# Patient Record
Sex: FEMALE | Race: BLACK OR AFRICAN AMERICAN | NOT HISPANIC OR LATINO
[De-identification: names, ages, dates, MRNs, and addresses within clinical notes are randomized per-mention and may not be internally consistent; named-entity substitution may affect disease eponyms.]

---

## 2017-05-01 ENCOUNTER — TRANSCRIPTION ENCOUNTER (OUTPATIENT)
Age: 57
End: 2017-05-01

## 2017-12-26 ENCOUNTER — TRANSCRIPTION ENCOUNTER (OUTPATIENT)
Age: 57
End: 2017-12-26

## 2018-07-27 ENCOUNTER — TRANSCRIPTION ENCOUNTER (OUTPATIENT)
Age: 58
End: 2018-07-27

## 2018-09-25 ENCOUNTER — RESULT REVIEW (OUTPATIENT)
Age: 58
End: 2018-09-25

## 2018-10-30 ENCOUNTER — RESULT REVIEW (OUTPATIENT)
Age: 58
End: 2018-10-30

## 2019-03-21 ENCOUNTER — APPOINTMENT (OUTPATIENT)
Dept: PODIATRY | Facility: CLINIC | Age: 59
End: 2019-03-21
Payer: COMMERCIAL

## 2019-03-21 VITALS
SYSTOLIC BLOOD PRESSURE: 122 MMHG | DIASTOLIC BLOOD PRESSURE: 70 MMHG | WEIGHT: 130 LBS | HEIGHT: 64 IN | BODY MASS INDEX: 22.2 KG/M2

## 2019-03-21 DIAGNOSIS — L60.9 NAIL DISORDER, UNSPECIFIED: ICD-10-CM

## 2019-03-21 DIAGNOSIS — Z78.9 OTHER SPECIFIED HEALTH STATUS: ICD-10-CM

## 2019-03-21 PROBLEM — Z00.00 ENCOUNTER FOR PREVENTIVE HEALTH EXAMINATION: Status: ACTIVE | Noted: 2019-03-21

## 2019-03-21 PROCEDURE — 11755 BIOPSY NAIL UNIT: CPT

## 2019-03-21 PROCEDURE — 99203 OFFICE O/P NEW LOW 30 MIN: CPT | Mod: 25

## 2019-03-21 NOTE — PROCEDURE
[FreeTextEntry1] : A lengthy and inform a discussion with the patient regarding different types of treatments for the mycotic nail disease. I stressed clinical versus mycologic cure rates and anticipated success rates regarding topical medications oral medications as well as laser therapy. I discussed in great length with the patient the success rates and success rates anticipated. There were no guarantees given regarding any of the treatment reviewed. I did explain to the patient that there are risks to oral antifungal therapy however those risks can be greatly decreased with obtaining blood tests prior and possibly during the treatment if indicated. The patient will weigh their options and contact the office\par After a lengthy discussion with the patient regarding all treatment options for nail disease and the need to have a definitive diagnosis regarding knowing the etiology of the nail disease I have received verbal consent in front of my  for nail unit biopsy. Next both nails were soaked with copious amounts of viscous lidocaine as well as soaked with copious amounts of topical lidocaine. There was a significant amount of lysis from each nail off the nailbed. There was no need for injectable anesthetic at this point. Next a good portion of the RIGHT as well as the LEFT hallux nails were removed via sharp dissection as well as a good portion of the nailbed and nail tissue below the nail without significant damage/harm to the nail bed itself. Wound care was discussed at length with the patient and she would be an area it get red and painful and began to drain they should contact the office at once otherwise we will notify the patient when the results of the biopsy have returned which should be approximately 5-7 days. Part of these nails were sent for a KOH prep and part for surgical pathology. Dry sterile bandages were applied discharge instructions reviewed and the patient was advised we'll contact them when the laboratory data returns and we could have a definitive discussion regarding treatment options.\par I had a lengthy discussion with the patient regarding the way they should be cutting a nail in an effort to help prevent recurrence of this problem. I also revised self treatment should not be attempted and should there be any redness or pain on the side of the nail rather than treating it themselves they should call the office to make a follow up.\par

## 2019-03-21 NOTE — PHYSICAL EXAM
[General Appearance - Alert] : alert [General Appearance - In No Acute Distress] : in no acute distress [Full Pulse] : the pedal pulses are present [Edema] : there was no peripheral edema [Abnormal Walk] : normal gait [Nail Clubbing] : no clubbing  or cyanosis of the fingernails [Musculoskeletal - Swelling] : no joint swelling seen [Motor Tone] : muscle strength and tone were normal [Skin Color & Pigmentation] : normal skin color and pigmentation [Skin Turgor] : normal skin turgor [] : no rash [FreeTextEntry1] : The only exception is clinically appearing superficial white onychomycosis subungual debris and elevation of the nail bilateral [Deep Tendon Reflexes (DTR)] : deep tendon reflexes were 2+ and symmetric [Sensation] : the sensory exam was normal to light touch and pinprick [No Focal Deficits] : no focal deficits

## 2019-03-21 NOTE — REVIEW OF SYSTEMS
[As Noted in HPI] : as noted in HPI [Skin Lesions] : no skin lesions [Skin Wound] : no skin wound [Itching] : no itching [Negative] : Neurological [de-identified] : white flakey nails

## 2019-03-21 NOTE — HISTORY OF PRESENT ILLNESS
[FreeTextEntry1] : patient c/o Chronic thickening and widening of the top both great toenails the history of present illness is approximately 6 months treatment has been the only treatment and it is exacerbated by over-the-counter products seem to make it worse

## 2019-03-28 LAB — CORE LAB BIOPSY: NORMAL

## 2019-10-03 ENCOUNTER — RECORD ABSTRACTING (OUTPATIENT)
Age: 59
End: 2019-10-03

## 2019-10-03 DIAGNOSIS — M19.041 PRIMARY OSTEOARTHRITIS, RIGHT HAND: ICD-10-CM

## 2019-10-03 DIAGNOSIS — Z84.2 FAMILY HISTORY OF OTHER DISEASES OF THE GENITOURINARY SYSTEM: ICD-10-CM

## 2019-10-03 DIAGNOSIS — K56.609 UNSPECIFIED INTESTINAL OBSTRUCTION, UNSPECIFIED AS TO PARTIAL VERSUS COMPLETE OBSTRUCTION: ICD-10-CM

## 2019-10-03 DIAGNOSIS — K59.1 FUNCTIONAL DIARRHEA: ICD-10-CM

## 2019-10-03 DIAGNOSIS — Z82.49 FAMILY HISTORY OF ISCHEMIC HEART DISEASE AND OTHER DISEASES OF THE CIRCULATORY SYSTEM: ICD-10-CM

## 2019-10-03 DIAGNOSIS — R10.9 UNSPECIFIED ABDOMINAL PAIN: ICD-10-CM

## 2019-10-03 DIAGNOSIS — Z83.3 FAMILY HISTORY OF DIABETES MELLITUS: ICD-10-CM

## 2019-10-03 DIAGNOSIS — R00.2 PALPITATIONS: ICD-10-CM

## 2019-10-03 DIAGNOSIS — R76.8 OTHER SPECIFIED ABNORMAL IMMUNOLOGICAL FINDINGS IN SERUM: ICD-10-CM

## 2019-10-03 DIAGNOSIS — Z83.49 FAMILY HISTORY OF OTHER ENDOCRINE, NUTRITIONAL AND METABOLIC DISEASES: ICD-10-CM

## 2019-10-03 DIAGNOSIS — R00.0 TACHYCARDIA, UNSPECIFIED: ICD-10-CM

## 2019-10-03 RX ORDER — ASCORBIC ACID 250 MG
250 TABLET,CHEWABLE ORAL
Refills: 0 | Status: ACTIVE | COMMUNITY

## 2019-10-22 ENCOUNTER — APPOINTMENT (OUTPATIENT)
Dept: GASTROENTEROLOGY | Facility: HOSPITAL | Age: 59
End: 2019-10-22

## 2020-04-26 ENCOUNTER — MESSAGE (OUTPATIENT)
Age: 60
End: 2020-04-26

## 2020-05-04 ENCOUNTER — APPOINTMENT (OUTPATIENT)
Age: 60
End: 2020-05-04

## 2020-05-05 LAB
SARS-COV-2 IGG SERPL IA-ACNC: 7.4 AU/ML
SARS-COV-2 IGG SERPL QL IA: NEGATIVE

## 2020-06-02 ENCOUNTER — APPOINTMENT (OUTPATIENT)
Dept: PODIATRY | Facility: CLINIC | Age: 60
End: 2020-06-02
Payer: COMMERCIAL

## 2020-06-02 VITALS
WEIGHT: 130 LBS | HEIGHT: 64 IN | DIASTOLIC BLOOD PRESSURE: 80 MMHG | SYSTOLIC BLOOD PRESSURE: 140 MMHG | BODY MASS INDEX: 22.2 KG/M2

## 2020-06-02 PROCEDURE — 73630 X-RAY EXAM OF FOOT: CPT | Mod: LT

## 2020-06-02 PROCEDURE — L3000: CPT | Mod: RT

## 2020-06-02 PROCEDURE — 99213 OFFICE O/P EST LOW 20 MIN: CPT | Mod: 25

## 2020-06-02 NOTE — PROCEDURE
[FreeTextEntry1] : X-rays were taken in the office multiple views right foot\par X-rays were taken in the office multiple views of the left foot\par I have reviewed the x-rays taken in the office with the patient, I have discussed my findings my recommendations etiology and treatment options based on x-ray evaluation and interpretation and patient understands, There is no evidence of fracture dislocation\par I had a lengthy and informative discussion with the patient today regarding plantar fasciitis. I explained to the patient that there are several etiologies as well contributing factors to this problem as well as what can aggravate it. It could include the presence or lack of of a heel spur, the type of foot type they have, the way they walk, it also could be related to the type of shoes they wear. I also made them understand that it could be a combination of all these problems together. I explained etiologies treatment options both conservative and surgical. I gave educational literature regarding this problem. Some of the treatment options as they range from conservative to aggressive include over-the-counter anti-inflammatories, prescription anti-inflammatories, custom shoes, custom orthotics, steroid injection, physical therapy, night splints, orthotripsy, and NSAIDS. There is also possible surgical intervention if all conservative measures failed to alleviate the problem. I did explain to the patient the type of shoe gear this should be wearing and gave him a copy of my plantar fascia stretching exercises with instructions to ice afterwards.\par Refuses nsaids\par The patient was advised formal physical therapy is critical at this point and that I recommend it in order to try and achieve best possible outcome to their problem. Rx has been supplied.\par A complete and thorough evaluation of the type of shoes they should be wearing and type of shoes for this time of year was discussed with patient.\par \par During the evaluation and management I had a lengthy discussion with the patient regarding benefits of functional foot orthoses. I explained to the patient the etiology and treatment options and one of them included the offloading and balancing of the painful portion of the foot. I explained the importance of balancing in offloading the painful area as part of the overall treatment process to advance healing. I have asked the patient to consider this as part of the treatment\par After a lengthy discussion with the patient regarding the possible benefits of orthotics and what we hope to achieve with them as it relates to their diagnosis the patient has agreed to be casted for the devices, The patient was then casted for a pair of custom functional foot orthoses with the subtalar joint in neutral, the forefoot locked, The patient was advised that they will be notified when the orthotics are returned from the laboratory, Should there be any questions or concerns they were advised to contact the office immediately, Educational literature regarding orthotics were dispensed, Included in the casting for orthotics was an overall gait exam and biomechanical evaluation\par

## 2020-06-02 NOTE — PHYSICAL EXAM
[Skin Color & Pigmentation] : normal skin color and pigmentation [Skin Turgor] : normal skin turgor [] : no rash [Sensation] : the sensory exam was normal to light touch and pinprick [Deep Tendon Reflexes (DTR)] : deep tendon reflexes were 2+ and symmetric [No Focal Deficits] : no focal deficits [Impaired Insight] : insight and judgment were intact [Affect] : the affect was normal [Oriented To Time, Place, And Person] : oriented to person, place, and time [Full Pulse] : the pedal pulses are present [Edema] : there was no peripheral edema [FreeTextEntry1] : no bruising, no ecchymosis, no breaks in the skin, no evidence of plantar fibromas noted., no history of injury or trauma, reproducible pain upon palpation of the plantar aspect of the calcaneus without medial lateral squeeze pain, pain along the medial band of the plantar fascia and insertion of the plantar fascia to calcaneus, patient able to walk on the heels but does admit to pain, admits to post-static dyskinesia., admits to pain at the end of the day., no pain to the posterior aspect of the calcaneus, no evidence of Aicha's deformity, no void felt in the Achilles tendon, patient able to walk on there toes without pain, denies numbness tingling and sharp shooting pains, no evidence of a Tinel's sign upon percussion of the posterior tibial nerve\par \par \par

## 2020-06-02 NOTE — REVIEW OF SYSTEMS
[As Noted in HPI] : as noted in HPI [Negative] : Integumentary [Leg Claudication] : no intermittent leg claudication [Lower Ext Edema] : no lower extremity edema

## 2020-06-23 ENCOUNTER — APPOINTMENT (OUTPATIENT)
Dept: PODIATRY | Facility: CLINIC | Age: 60
End: 2020-06-23
Payer: COMMERCIAL

## 2020-06-23 VITALS — BODY MASS INDEX: 22.2 KG/M2 | WEIGHT: 130 LBS | HEIGHT: 64 IN

## 2020-06-23 PROCEDURE — 99213 OFFICE O/P EST LOW 20 MIN: CPT

## 2020-06-23 NOTE — HISTORY OF PRESENT ILLNESS
[FreeTextEntry1] : Location-both feet\par Duration-4 months\par Past tx- self PT, formal PT\par presents to  orthotics\par

## 2020-06-23 NOTE — PHYSICAL EXAM
[General Appearance - In No Acute Distress] : in no acute distress [General Appearance - Alert] : alert [Full Pulse] : the pedal pulses are present [Edema] : there was no peripheral edema [Skin Turgor] : normal skin turgor [Skin Color & Pigmentation] : normal skin color and pigmentation [] : no rash [Sensation] : the sensory exam was normal to light touch and pinprick [No Focal Deficits] : no focal deficits [Deep Tendon Reflexes (DTR)] : deep tendon reflexes were 2+ and symmetric [FreeTextEntry1] : Examination shows less pain upon palpation of the plantar aspect of the heel, little pain on the medial band of the plantar fascia, the patient denies as much pain at the end of the day, the pain they currently experience is more in the form of post static dyskinesia. The patient states prior treatments have improved the condition.

## 2020-06-23 NOTE — PROCEDURE
[FreeTextEntry1] : Orthotic Evaluation the orthotics have been evaluated and I do feel that there is a good fit to the patient's foot and they have been made to my specifications. \par          Clinical Notes: The patient presents for dispensing of custom molded foot orthotics. I have removed the orthotics from the packaging and I have examined him. They do appear to be made as per my instructions regarding materials additions corrections. Upon placing him to the patient's foot they do appear to fit nicely. There is no material failure nor gapping. They were then trimmed to fit and placed inside the patient's shoe gear. There appears to be a good fit. Upon initial ambulation the patient has no initial complaints regarding pain off edges were tight fit. The patient did ambulate around the office for several minutes and had a favorable result. I then explained to the patient the normal break-in period. The paperwork supplied by the laboratory was reviewed with the patient. They understand a normal break-in period is to be gradual over several weeks. I've advised the patient that different, weird, and uncomfortable are certainly acceptable words in the beginning however pain, blister and callus are things that should not occur. Once the proper break-in was explained to the patient they were given an appointment to follow up.\par \par Since the patient is currently taking non-steroidal anti-inflammatory medication I had a complete and thorough discussion with the patient regarding risks and benefits of these types of medications. The most common side effects include but are not limited to gastrointestinal upset, blood in the stool, nausea, diarrhea, as well as tinnitus. I have advised the patient that if they should experience any of the side effects that she discontinue them at once and contacted primary care physician for immediate followup. I did advise the patient that these types of medications can be taken on an as needed basis and if they are not having pain he should not take them. I also advised that the patient should follow the explicit instructions on the labile and not to exceed the recommended dosage.\par

## 2020-10-02 ENCOUNTER — TRANSCRIPTION ENCOUNTER (OUTPATIENT)
Age: 60
End: 2020-10-02

## 2021-04-07 ENCOUNTER — APPOINTMENT (OUTPATIENT)
Dept: PODIATRY | Facility: CLINIC | Age: 61
End: 2021-04-07
Payer: COMMERCIAL

## 2021-04-07 VITALS — WEIGHT: 130 LBS | HEIGHT: 64 IN | BODY MASS INDEX: 22.2 KG/M2

## 2021-04-07 PROCEDURE — 99214 OFFICE O/P EST MOD 30 MIN: CPT | Mod: 25

## 2021-04-07 PROCEDURE — 99072 ADDL SUPL MATRL&STAF TM PHE: CPT

## 2021-04-07 PROCEDURE — L3000: CPT | Mod: RT

## 2021-04-07 NOTE — PHYSICAL EXAM
[Full Pulse] : the pedal pulses are present [Edema] : there was no peripheral edema [Skin Color & Pigmentation] : normal skin color and pigmentation [Skin Turgor] : normal skin turgor [] : no rash [Deep Tendon Reflexes (DTR)] : deep tendon reflexes were 2+ and symmetric [Sensation] : the sensory exam was normal to light touch and pinprick [No Focal Deficits] : no focal deficits [Oriented To Time, Place, And Person] : oriented to person, place, and time [Impaired Insight] : insight and judgment were intact [Affect] : the affect was normal [FreeTextEntry1] : no bruising, no ecchymosis, no breaks in the skin, no evidence of plantar fibromas noted., no history of injury or trauma, reproducible pain upon palpation of the plantar aspect of the calcaneus without medial lateral squeeze pain, pain along the medial band of the plantar fascia and insertion of the plantar fascia to calcaneus, patient able to walk on the heels but does admit to pain, admits to post-static dyskinesia., admits to pain at the end of the day., no pain to the posterior aspect of the calcaneus, no evidence of Aicha's deformity, no void felt in the Achilles tendon, patient able to walk on there toes without pain, denies numbness tingling and sharp shooting pains, no evidence of a Tinel's sign upon percussion of the posterior tibial nerve\par \par \par

## 2021-04-07 NOTE — REVIEW OF SYSTEMS
[As Noted in HPI] : as noted in HPI [Negative] : Constitutional [Leg Claudication] : no intermittent leg claudication [Lower Ext Edema] : no lower extremity edema

## 2021-04-07 NOTE — PROCEDURE
[FreeTextEntry1] : All diagnostic test, labs, imaging, prior notes and reports (both new and recent) reviewed prior to seeing the patient and discussed at length face to face with the patient. How these results pertain to the patient, their diagnosis and treatments also reviewed. All questions asked and answered appropriately. The risks and complications of not following my recommendations d/w the patient.\par had a lengthy discussion with the patient regarding the diagnosis etiology and differential diagnosis as well as treatment options for the presenting problem. Risks alternatives and benefits of treatment ranging from conservative to surgical explained in great detail. I also explained the progression of treatment from conservative to possible surgical treatment options as well as the benefits of each. I do stress conservative treatment if in fact conservative treatment is an option until it no longer provides relief. Over-the-counter products medications padding, and splinting were reviewed as well. All questions asked and answered appropriately to the patient's satisfaction\par An overall gait examination was performed where the rearfoot and the midfoot and forefoot was evaluated both with the patient walking away and then towards me. then again repeated on their toes and their heels. I then explained my findings to the patient and then may benefit from a pair of orthotics and how the orthotics would control their symptoms\par a complete and comprehensive lower extremity biomechanical exam was performed., I evaluated the rear foot the subtalar joint the midfoot and forefoot during the comprehensive gait evaluation, muscle strength as well as muscle strength testing was incorporated into my findings when evaluating the lower extremity biomechanics., my findings were discussed reviewed and explained to the patient, I do think the patient would benefit from a pair of custom molded foot orthoses I have reviewed the benefits of the orthotics and advised they would benefit and have recommended they proceed with fabrication\par The patient was advised formal physical therapy is critical at this point and that I recommend it in order to try and achieve best possible outcome to their problem. Rx has been supplied.\par After a lengthy discussion with the patient regarding the possible benefits of orthotics and what we hope to achieve with them as it relates to their diagnosis the patient has agreed to be casted for the devices, The patient was then casted for a pair of custom functional foot orthoses with the subtalar joint in neutral, the forefoot locked, The patient was advised that they will be notified when the orthotics are returned from the laboratory, Should there be any questions or concerns they were advised to contact the office immediately, Educational literature regarding orthotics were dispensed, Included in the casting for orthotics was an overall gait exam and biomechanical evaluation\par greater than 30 minutes spent with patient\par \par I had a lengthy and informative discussion with the patient today regarding plantar fasciitis. I explained to the patient that there are several etiologies as well contributing factors to this problem as well as what can aggravate it. It could include the presence or lack of of a heel spur, the type of foot type they have, the way they walk, it also could be related to the type of shoes they wear. I also made them understand that it could be a combination of all these problems together. I explained etiologies treatment options both conservative and surgical. I gave educational literature regarding this problem. Some of the treatment options as they range from conservative to aggressive include over-the-counter anti-inflammatories, prescription anti-inflammatories, custom shoes, custom orthotics, steroid injection, physical therapy, night splints, orthotripsy, and NSAIDS. There is also possible surgical intervention if all conservative measures failed to alleviate the problem. I did explain to the patient the type of shoe gear this should be wearing and gave him a copy of my plantar fascia stretching exercises with instructions to ice afterwards.\par Refuses nsaids\par The patient was advised formal physical therapy is critical at this point and that I recommend it in order to try and achieve best possible outcome to their problem. Rx has been supplied.\par A complete and thorough evaluation of the type of shoes they should be wearing and type of shoes for this time of year was discussed with patient.\par \par During the evaluation and management I had a lengthy discussion with the patient regarding benefits of functional foot orthoses. I explained to the patient the etiology and treatment options and one of them included the offloading and balancing of the painful portion of the foot. I explained the importance of balancing in offloading the painful area as part of the overall treatment process to advance healing. I have asked the patient to consider this as part of the treatment\par After a lengthy discussion with the patient regarding the possible benefits of orthotics and what we hope to achieve with them as it relates to their diagnosis the patient has agreed to be casted for the devices, The patient was then casted for a pair of custom functional foot orthoses with the subtalar joint in neutral, the forefoot locked, The patient was advised that they will be notified when the orthotics are returned from the laboratory, Should there be any questions or concerns they were advised to contact the office immediately, Educational literature regarding orthotics were dispensed, Included in the casting for orthotics was an overall gait exam and biomechanical evaluation\par

## 2021-05-05 ENCOUNTER — APPOINTMENT (OUTPATIENT)
Dept: PODIATRY | Facility: CLINIC | Age: 61
End: 2021-05-05
Payer: COMMERCIAL

## 2021-05-05 VITALS — BODY MASS INDEX: 22.2 KG/M2 | HEIGHT: 64 IN | WEIGHT: 130 LBS

## 2021-05-05 PROCEDURE — 99213 OFFICE O/P EST LOW 20 MIN: CPT

## 2021-05-05 PROCEDURE — 99072 ADDL SUPL MATRL&STAF TM PHE: CPT

## 2021-05-05 NOTE — PHYSICAL EXAM
[Full Pulse] : the pedal pulses are present [Edema] : there was no peripheral edema [Skin Color & Pigmentation] : normal skin color and pigmentation [Skin Turgor] : normal skin turgor [] : no rash [Deep Tendon Reflexes (DTR)] : deep tendon reflexes were 2+ and symmetric [Sensation] : the sensory exam was normal to light touch and pinprick [No Focal Deficits] : no focal deficits [Oriented To Time, Place, And Person] : oriented to person, place, and time [Impaired Insight] : insight and judgment were intact [Affect] : the affect was normal [FreeTextEntry1] : Examination shows less pain upon palpation of the plantar aspect of the heel, little pain on the medial band of the plantar fascia, the patient denies as much pain at the end of the day, the pain they currently experience is more in the form of post static dyskinesia. The patient states prior treatments have significantly improved the condition. No additional signs or symptoms regarding heel pain noted.

## 2021-05-05 NOTE — PROCEDURE
[FreeTextEntry1] : Orthotic Evaluation the orthotics have been evaluated and I do feel that there is a good fit to the patient's foot and they have been made to my specifications. \par          Clinical Notes: The patient presents for dispensing of custom molded foot orthotics. I have removed the orthotics from the packaging and I have examined him. They do appear to be made as per my instructions regarding materials additions corrections. Upon placing him to the patient's foot they do appear to fit nicely. There is no material failure nor gapping. They were then trimmed to fit and placed inside the patient's shoe gear. There appears to be a good fit. Upon initial ambulation the patient has no initial complaints regarding pain off edges were tight fit. The patient did ambulate around the office for several minutes and had a favorable result. I then explained to the patient the normal break-in period. The paperwork supplied by the laboratory was reviewed with the patient. They understand a normal break-in period is to be gradual over several weeks. I've advised the patient that different, weird, and uncomfortable are certainly acceptable words in the beginning however pain, blister and callus are things that should not occur. Once the proper break-in was explained to the patient they were given an appointment to follow up.\par \par Patient was advised to continue formal PT because he is feeling good relief from it.\par \par follow up appt 3 weeks\par

## 2021-05-05 NOTE — HISTORY OF PRESENT ILLNESS
[FreeTextEntry1] : Location-both HEELS, RIGHT >LEFT\par Duration-> 1 YEAR months\par Past tx- self PT, formal PT, RICE, soaks, \par requests a firmer pair does not think these support her enough\par \par In PT and when she goes she sees improvement\par here to p/u new orthotics

## 2021-05-11 ENCOUNTER — TRANSCRIPTION ENCOUNTER (OUTPATIENT)
Age: 61
End: 2021-05-11

## 2021-07-06 ENCOUNTER — RESULT REVIEW (OUTPATIENT)
Age: 61
End: 2021-07-06

## 2021-07-06 ENCOUNTER — APPOINTMENT (OUTPATIENT)
Dept: PODIATRY | Facility: CLINIC | Age: 61
End: 2021-07-06
Payer: COMMERCIAL

## 2021-07-06 ENCOUNTER — NON-APPOINTMENT (OUTPATIENT)
Age: 61
End: 2021-07-06

## 2021-07-06 VITALS — HEIGHT: 64 IN | BODY MASS INDEX: 22.2 KG/M2 | WEIGHT: 130 LBS

## 2021-07-06 PROCEDURE — 20550 NJX 1 TENDON SHEATH/LIGAMENT: CPT | Mod: RT

## 2021-07-06 PROCEDURE — 99072 ADDL SUPL MATRL&STAF TM PHE: CPT

## 2021-07-06 RX ORDER — METHYLPRED ACET/NACL,ISO-OS/PF 40 MG/ML
40 VIAL (ML) INJECTION
Qty: 1 | Refills: 0 | Status: COMPLETED | OUTPATIENT
Start: 2021-07-06

## 2021-07-06 RX ADMIN — METHYLPREDNISOLONE ACETATE MG/ML: 40 INJECTION, SUSPENSION INTRA-ARTICULAR; INTRALESIONAL; INTRAMUSCULAR; SOFT TISSUE at 00:00

## 2021-07-06 NOTE — PHYSICAL EXAM
Communication History     User: Yasmine Eckert Date/time: 3/28/2018  2:35 PM    Comment: Per Jaron at [facility], shower bench delivered on 03/21/18. CC notified.    Context:  Outcome:     Phone number:  Phone Type:     Comm. type: Email Call type: Incoming    Contact: [facility] Medical Relation to patient: Donya Eckert  CMS Supervisor  [facility]  [phone]     [Full Pulse] : the pedal pulses are present [Edema] : there was no peripheral edema [Skin Turgor] : normal skin turgor [Skin Color & Pigmentation] : normal skin color and pigmentation [] : no rash [Deep Tendon Reflexes (DTR)] : deep tendon reflexes were 2+ and symmetric [No Focal Deficits] : no focal deficits [Sensation] : the sensory exam was normal to light touch and pinprick [Oriented To Time, Place, And Person] : oriented to person, place, and time [Impaired Insight] : insight and judgment were intact [Affect] : the affect was normal [FreeTextEntry1] : 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

## 2021-07-06 NOTE — PROCEDURE
[FreeTextEntry1] : Patient was advised to continue formal PT because he is feeling good relief from it.\par A lengthy discussion with the patient regarding risks and benefits of steroid injection. I explained to the patient that in the natural progression of orthopedic type problems oral anti-inflammatories and injectable anti-inflammatory medication or an integral part of the treatment process. I did explain to the patient some of the risks associated with steroid injection included but were not limited to infection at the puncture site, discoloration of skin, the condition being no better, and most importantly the risk of soft tissue injury. I did explain to the patient, in certain rare occasions, there is soft tissue damage including but not limited to atrophy of the soft tissue, necrosis of the tissue, rupture of the ligament, and or possibly rupture of the tendon.\par After weighing the risks alternatives and benefits to the injection the patient agreed to proceed. All questions asked and answered appropriately.\par \par After obtaining verbal consent and a lengthy discussion regarding risks alternatives and benefits the patient had approximately 2 cc of 0.5% Marcaine plain mixed with 2 cc of 1% lidocaine plain 1 cc of Depo-Medrol 40 mg per cc. The medial aspect of the affected heel was cleansed with alcohol painted with Betadine and using ethyl chloride and approach and a local infiltrated in and about the plantar fascia to calcaneal insertion area. It was a local infiltrated block the patient tolerated well left the office with vital signs stable vascular status intact.\par

## 2021-07-06 NOTE — HISTORY OF PRESENT ILLNESS
[FreeTextEntry1] : Location-right heel, left is ok\par Duration-> 1 YEAR months\par Past tx- self PT, formal PT, RICE, chilo, \par \par \par

## 2021-08-03 ENCOUNTER — APPOINTMENT (OUTPATIENT)
Dept: PODIATRY | Facility: CLINIC | Age: 61
End: 2021-08-03

## 2021-08-13 ENCOUNTER — APPOINTMENT (OUTPATIENT)
Dept: PODIATRY | Facility: CLINIC | Age: 61
End: 2021-08-13
Payer: COMMERCIAL

## 2021-08-13 VITALS — HEIGHT: 64 IN | WEIGHT: 130 LBS | BODY MASS INDEX: 22.2 KG/M2

## 2021-08-13 DIAGNOSIS — M77.51 OTHER ENTHESOPATHY OF RT FOOT AND ANKLE: ICD-10-CM

## 2021-08-13 DIAGNOSIS — M77.52 OTHER ENTHESOPATHY OF LT FOOT AND ANKLE: ICD-10-CM

## 2021-08-13 PROCEDURE — 99213 OFFICE O/P EST LOW 20 MIN: CPT

## 2021-08-13 NOTE — PROCEDURE
[FreeTextEntry1] : Copy of my range of motion and strengthening exercises were distributed to the patient. I also explained that the patient must do these daily as well as ice afterwards. I also advised the type of shoe gear that would both support as well as conversely aggravate this condition. that since they are seen good results from physical therapy that they\par I have advised the patient that they may return to normal activity level but to limit it to tolerance. I advised them at this time that they do not need any assistive device special shoe bandaging or bracing. I also advised that should they be doing her normal daily function and they should experience some pain that upon finishing that they should return to anti-inflammatory medication over-the-counter if possible, ice and elevation. If this continues more than 24-48 hours he should contact the office immediately for followup appointment\par A complete and thorough evaluation of the type of shoes they should be wearing and type of shoes for this time of year was discussed with patient.\par NSAIDS prn\par follow up prn\par

## 2021-08-13 NOTE — HISTORY OF PRESENT ILLNESS
[FreeTextEntry1] : Location-right heel, left is ok\par Duration-> 1 YEAR months\par Past tx- self PT, formal PT, RICE, soaks, , steroid\par \par states 90% improved\par \par \par

## 2021-09-02 ENCOUNTER — NON-APPOINTMENT (OUTPATIENT)
Age: 61
End: 2021-09-02

## 2021-10-11 ENCOUNTER — APPOINTMENT (OUTPATIENT)
Dept: PODIATRY | Facility: CLINIC | Age: 61
End: 2021-10-11
Payer: COMMERCIAL

## 2021-10-11 VITALS — WEIGHT: 130 LBS | HEIGHT: 64 IN | BODY MASS INDEX: 22.2 KG/M2

## 2021-10-11 DIAGNOSIS — M20.12 HALLUX VALGUS (ACQUIRED), LEFT FOOT: ICD-10-CM

## 2021-10-11 PROCEDURE — 99213 OFFICE O/P EST LOW 20 MIN: CPT

## 2021-10-11 PROCEDURE — 73630 X-RAY EXAM OF FOOT: CPT | Mod: LT

## 2021-10-11 RX ORDER — DICLOFENAC SODIUM 50 MG/1
50 TABLET, DELAYED RELEASE ORAL
Qty: 28 | Refills: 1 | Status: DISCONTINUED | COMMUNITY
Start: 2021-07-06 | End: 2021-10-11

## 2021-10-11 NOTE — PROCEDURE
[FreeTextEntry1] : X-rays were taken in the office multiple views of the left foot\par HAV noted, xray negative for heel spur\par \par had a lengthy discussion with the patient regarding the diagnosis etiology and differential diagnosis as well as treatment options for the presenting problem. Risks alternatives and benefits of treatment ranging from conservative to surgical explained in great detail. I also explained the progression of treatment from conservative to possible surgical treatment options as well as the benefits of each. I do stress conservative treatment if in fact conservative treatment is an option until it no longer provides relief. Over-the-counter products medications padding, and splinting were reviewed as well. All questions asked and answered appropriately to the patient's satisfaction\par \par Since the patient is currently taking non-steroidal anti-inflammatory medication I had a complete and thorough discussion with the patient regarding risks and benefits of these types of medications. The most common side effects include but are not limited to gastrointestinal upset, blood in the stool, nausea, diarrhea, as well as tinnitus. I have advised the patient that if they should experience any of the side effects that she discontinue them at once and contacted primary care physician for immediate followup. I did advise the patient that these types of medications can be taken on an as needed basis and if they are not having pain he should not take them. I also advised that the patient should follow the explicit instructions on the labile and not to exceed the recommended dosage.\par \par The patient was advised formal physical therapy is critical at this point and that I recommend it in order to try and achieve best possible outcome to their problem. Rx has been supplied.\par \par continue w/ orthotics\par \par The patient was advised formal physical therapy is critical at this point and that I recommend it in order to try and achieve best possible outcome to their problem. Rx has been supplied.\par \par follow up appt 4 weeks\par

## 2021-10-11 NOTE — REVIEW OF SYSTEMS
[Negative] : Integumentary [Leg Claudication] : no intermittent leg claudication [Lower Ext Edema] : no lower extremity edema [Joint Swelling] : no joint swelling [Joint Stiffness] : no joint stiffness [Limb Pain] : no limb pain [Limb Swelling] : no limb swelling [FreeTextEntry9] : left heel pain and left bunion

## 2021-10-11 NOTE — HISTORY OF PRESENT ILLNESS
[FreeTextEntry1] : Location-bottom of left heel\par Duration-about a month\par Past tx- nsaids, orthotics\par

## 2021-10-11 NOTE — REASON FOR VISIT
[Follow-Up Visit] : a follow-up visit for [Foot Deformity] : foot deformity [Foot Pain] : foot pain [FreeTextEntry2] : left foot, heel and bunion

## 2022-01-05 ENCOUNTER — APPOINTMENT (OUTPATIENT)
Dept: PODIATRY | Facility: CLINIC | Age: 62
End: 2022-01-05
Payer: COMMERCIAL

## 2022-01-05 VITALS — HEIGHT: 64 IN | WEIGHT: 130 LBS | BODY MASS INDEX: 22.2 KG/M2

## 2022-01-05 DIAGNOSIS — M72.2 PLANTAR FASCIAL FIBROMATOSIS: ICD-10-CM

## 2022-01-05 PROCEDURE — 20550 NJX 1 TENDON SHEATH/LIGAMENT: CPT

## 2022-01-05 PROCEDURE — 99213 OFFICE O/P EST LOW 20 MIN: CPT | Mod: 25

## 2022-01-05 RX ORDER — DEXAMETHASONE SODIUM PHOSPHATE 4 MG/ML
4 INJECTION, SOLUTION INTRAMUSCULAR; INTRAVENOUS
Qty: 0 | Refills: 0 | Status: COMPLETED | OUTPATIENT
Start: 2022-01-05

## 2022-01-05 RX ORDER — DICLOFENAC SODIUM 50 MG/1
50 TABLET, DELAYED RELEASE ORAL
Qty: 60 | Refills: 1 | Status: DISCONTINUED | COMMUNITY
Start: 2021-10-11 | End: 2022-01-05

## 2022-01-05 RX ORDER — METHYLPRED ACET/NACL,ISO-OS/PF 40 MG/ML
40 VIAL (ML) INJECTION
Qty: 1 | Refills: 0 | Status: COMPLETED | OUTPATIENT
Start: 2022-01-05

## 2022-01-05 RX ADMIN — METHYLPREDNISOLONE ACETATE MG/ML: 40 INJECTION, SUSPENSION INTRA-ARTICULAR; INTRALESIONAL; INTRAMUSCULAR; SOFT TISSUE at 00:00

## 2022-01-05 RX ADMIN — DEXAMETHASONE SODIUM PHOSPHATE MG/ML: 4 INJECTION, SOLUTION INTRA-ARTICULAR; INTRALESIONAL; INTRAMUSCULAR; INTRAVENOUS; SOFT TISSUE at 00:00

## 2022-01-05 NOTE — PROCEDURE
[FreeTextEntry1] : The patient was advised formal physical therapy is critical at this point and that I recommend it in order to try and achieve best possible outcome to their problem. Rx has been supplied.\par After evaluating the patient and examining the area in question I feel at this time a magnetic resonance imaging is indicated right side to rule out stress fx or PF tear. Will also rx US to measure thickness of PF. this is  a medical necessity and this note will serve as a letter medical necessity. Given the severity of the injury and the mechanism of injury I am concerned about osseous as well as significant soft tissue pathology, injury tear and possible rupture. I feel that an magnetic resonance imaging is the most appropriate diagnostic tests at this time and the patient should be granted authorization for an magnetic resonance imaging\par I had a lengthy discussion with the patient today regarding proceeding with a mildly invasive procedure called Tenex procedure. Tenex is a minimally invasive procedure specially designed for those who are suffering from pain associated with tendon damage, such as pain in the rotator cuff, tennis or golfer's elbow, runner's or jumper's knee, Achilles tendonitis, or plantar fasciitis. It  locates & removes scar tissue that causes pain as a result of overuse or active life.Once the source of your tendon pain is identified, your doctor typically numbs the area with a local anesthetic, allowing you to stay awake the entire time. Many people say after the numbing process which feels like a bee sting they felt only a slight pressure during the procedure (if they felt anything at all). The doctor then uses gentle ultrasonic energy designed to safely breakdown and remove the damaged tissue. The ultrasonic energy is applied with the TX MicroTip, which requires only a micro incision to reach the damaged tissue. Because the incision is so small (similar to that of a cortisone shot) and the ultrasonic energy precisely treats only the damaged tendon tissue, the surrounding healthy tissue is left unharmed.\par left foot:\par A lengthy discussion with the patient regarding risks and benefits of steroid injection. I explained to the patient that in the natural progression of orthopedic type problems oral anti-inflammatories and injectable anti-inflammatory medication or an integral part of the treatment process. I did explain to the patient some of the risks associated with steroid injection included but were not limited to infection at the puncture site, discoloration of skin, the condition being no better, and most importantly the risk of soft tissue injury. I did explain to the patient, in certain rare occasions, there is soft tissue damage including but not limited to atrophy of the soft tissue, necrosis of the tissue, rupture of the ligament, and or possibly rupture of the tendon.\par After weighing the risks alternatives and benefits to the injection the patient agreed to proceed. All questions asked and answered appropriately.\par \par After obtaining verbal consent and a lengthy discussion regarding risks alternatives and benefits the patient had approximately 2 cc of 0.5% Marcaine plain mixed with 2 cc of 1% lidocaine plain 1 cc of Depo-Medrol 40 mg per cc and 1 cc of dexamethasone phosphate. The medial aspect of the affected heel was cleansed with alcohol painted with Betadine and using ethyl chloride and approach and a local infiltrated in and about the plantar fascia to calcaneal insertion area. It was a local infiltrated block the patient tolerated well left the office with vital signs stable vascular status intact.\par \par

## 2022-01-05 NOTE — PHYSICAL EXAM
[Full Pulse] : the pedal pulses are present [Edema] : there was no peripheral edema [Skin Color & Pigmentation] : normal skin color and pigmentation [Skin Turgor] : normal skin turgor [] : no rash [Deep Tendon Reflexes (DTR)] : deep tendon reflexes were 2+ and symmetric [Sensation] : the sensory exam was normal to light touch and pinprick [No Focal Deficits] : no focal deficits [Oriented To Time, Place, And Person] : oriented to person, place, and time [Impaired Insight] : insight and judgment were intact [Affect] : the affect was normal [FreeTextEntry1] : no bruising, no ecchymosis, no breaks in the skin, no evidence of plantar fibromas noted., no history of injury or trauma, reproducible pain upon palpation of the plantar aspect of the calcaneus without medial lateral squeeze pain, pain along the medial band of the plantar fascia and insertion of the plantar fascia to calcaneus, patient able to walk on the heels but does admit to pain, admits to post-static dyskinesia., admits to pain at the end of the day., no pain to the posterior aspect of the calcaneus, no evidence of Aicha's deformity, no void felt in the Achilles tendon, patient able to walk on there toes without pain, denies numbness tingling and sharp shooting pains, no evidence of a Tinel's sign upon percussion of the posterior tibial nerve\par \par \par  bilateral. requests bilateral steroid injection

## 2022-01-05 NOTE — REASON FOR VISIT
[Follow-Up Visit] : a follow-up visit for [Plantar Fasciitis] : plantar fasciitis [FreeTextEntry2] : BILATERAL--RIGHT FOOT SUDDEN ACUTE PAIN, LEFT CHRONIC BUT WORSENING

## 2022-01-30 ENCOUNTER — RESULT REVIEW (OUTPATIENT)
Age: 62
End: 2022-01-30

## 2022-02-01 ENCOUNTER — RESULT REVIEW (OUTPATIENT)
Age: 62
End: 2022-02-01

## 2022-02-03 ENCOUNTER — APPOINTMENT (OUTPATIENT)
Dept: PODIATRY | Facility: CLINIC | Age: 62
End: 2022-02-03
Payer: COMMERCIAL

## 2022-02-03 DIAGNOSIS — S96.911A STRAIN OF UNSPECIFIED MUSCLE AND TENDON AT ANKLE AND FOOT LEVEL, RIGHT FOOT, INITIAL ENCOUNTER: ICD-10-CM

## 2022-02-03 PROCEDURE — 99213 OFFICE O/P EST LOW 20 MIN: CPT

## 2022-02-03 NOTE — REASON FOR VISIT
[Follow-Up Visit] : a follow-up visit for [Plantar Fasciitis] : plantar fasciitis [FreeTextEntry2] : both feet

## 2022-02-03 NOTE — PHYSICAL EXAM
[General Appearance - Alert] : alert [General Appearance - In No Acute Distress] : in no acute distress [Skin Color & Pigmentation] : normal skin color and pigmentation [Skin Turgor] : normal skin turgor [Skin Lesions] : no skin lesions [Sensation] : the sensory exam was normal to light touch and pinprick [No Focal Deficits] : no focal deficits [Deep Tendon Reflexes (DTR)] : deep tendon reflexes were 2+ and symmetric [Motor Exam] : the motor exam was normal [Edema] : there was no peripheral edema [] : no rash [FreeTextEntry3] : Vascular exam reveals palpable pedal pulses, the foot is warm to touch, there was good capillary fill time, the skin is normal in appearance there is no evidence of vascular disease or compromise at this time [de-identified] : Examination shows less pain upon palpation of the plantar aspect of the heel, little pain on the medial band of the plantar fascia, the patient denies as much pain at the end of the day, the pain they currently experience is more in the form of post static dyskinesia. The patient states prior treatments have significantly improved the condition. No additional signs or symptoms regarding heel pain noted.\par However, on nsaid bid\par  [Foot Ulcer] : no foot ulcer [Skin Induration] : no skin induration

## 2022-02-03 NOTE — PROCEDURE
[FreeTextEntry1] : I have discussed the findings of the MRI and U/S pertaining to the presenting complaint. Treatment options also discussed with the patient. I also advised the patient that while an MRI is a good diagnostic tool is not a perfect picture and at times does not show all patholgy\par Seriousness of tear highl;ighted\par The patient presents with a pair of orthotics, and I have fitted them to their feet and they fit nicely with no evidence of breakdown. I advised them to continue to use them on a consistent basis\par I have advised the patient that they may return to normal activity level but to limit it to tolerance. I advised them at this time that they do not need any assistive device special shoe bandaging or bracing. I also advised that should they be doing her normal daily function and they should experience some pain that upon finishing that they should return to anti-inflammatory medication over-the-counter if possible, ice and elevation. If this continues more than 24-48 hours he should contact the office immediately for followup appointment\par A complete and thorough evaluation of the type of shoes they should be wearing and type of shoes for this time of year was discussed with patient.\par \par reduce nsaids to QD and only prn\par \par follow up prn\par \par \par  11-Feb-2020 17:18

## 2022-04-06 ENCOUNTER — RESULT REVIEW (OUTPATIENT)
Age: 62
End: 2022-04-06

## 2022-07-13 ENCOUNTER — APPOINTMENT (OUTPATIENT)
Dept: PODIATRY | Facility: CLINIC | Age: 62
End: 2022-07-13

## 2022-07-13 VITALS — HEIGHT: 64 IN | BODY MASS INDEX: 22.2 KG/M2 | WEIGHT: 130 LBS

## 2022-07-13 DIAGNOSIS — M72.2 PLANTAR FASCIAL FIBROMATOSIS: ICD-10-CM

## 2022-07-26 ENCOUNTER — NON-APPOINTMENT (OUTPATIENT)
Age: 62
End: 2022-07-26

## 2022-08-02 ENCOUNTER — APPOINTMENT (OUTPATIENT)
Dept: NEUROLOGY | Facility: CLINIC | Age: 62
End: 2022-08-02

## 2023-02-15 PROBLEM — Z87.19 HISTORY OF SMALL BOWEL OBSTRUCTION: Status: RESOLVED | Noted: 2023-02-15 | Resolved: 2023-02-15

## 2023-02-17 ENCOUNTER — APPOINTMENT (OUTPATIENT)
Dept: GASTROENTEROLOGY | Facility: CLINIC | Age: 63
End: 2023-02-17
Payer: COMMERCIAL

## 2023-02-17 VITALS
SYSTOLIC BLOOD PRESSURE: 120 MMHG | HEIGHT: 64 IN | DIASTOLIC BLOOD PRESSURE: 70 MMHG | BODY MASS INDEX: 21 KG/M2 | WEIGHT: 123 LBS

## 2023-02-17 DIAGNOSIS — K21.9 GASTRO-ESOPHAGEAL REFLUX DISEASE W/OUT ESOPHAGITIS: ICD-10-CM

## 2023-02-17 DIAGNOSIS — R11.0 NAUSEA: ICD-10-CM

## 2023-02-17 DIAGNOSIS — Z87.19 PERSONAL HISTORY OF OTHER DISEASES OF THE DIGESTIVE SYSTEM: ICD-10-CM

## 2023-02-17 PROCEDURE — 99213 OFFICE O/P EST LOW 20 MIN: CPT

## 2023-02-17 RX ORDER — VALACYCLOVIR HYDROCHLORIDE 500 MG/1
500 TABLET, FILM COATED ORAL
Refills: 0 | Status: DISCONTINUED | COMMUNITY
End: 2023-02-17

## 2023-02-17 NOTE — PHYSICAL EXAM
[Alert] : alert [Sclera] : the sclera and conjunctiva were normal [Hearing Threshold Finger Rub Not [place]] : hearing was normal [No Respiratory Distress] : no respiratory distress [None] : no edema [Abdomen Soft] : soft [Abnormal Walk] : normal gait [Normal Color / Pigmentation] : normal skin color and pigmentation [No Focal Deficits] : no focal deficits [Oriented To Time, Place, And Person] : oriented to person, place, and time

## 2023-02-17 NOTE — ASSESSMENT
[FreeTextEntry1] : Nausea / GERD: lifestyle and dietary modification. Prefers no meds. EGD planned to exclude PUD\par \par Pertinent available records reviewed\par Risks of the procedure including but not limited to bleeding / perforation / infection / anesthesia complication / missed polyp or lesion explained to the  patient . The patient expressed understanding and a desire to proceed with the procedure.\par \par Risk of not doing procedure includes but is not limited to missed or delayed diagnosis of gastrointestinal pathology.\par

## 2023-02-17 NOTE — HISTORY OF PRESENT ILLNESS
[FreeTextEntry1] : Presents  with a  c/o intermittent  nausea and reflux dfor the past several months.  No clear precipitating or alleviating factors identified.  Admits  to significant stress related to taking care of her mother at home.\par \par Denies melena, hematemesis, BRBPR, emesis, abdominal pain\par \par -Colonoscopy 10/2019:  Hemorrhoids / diverticulosis\par - Colonoscopy 11/2013: hemorrhoids / normal ileocolonic anastomosis\par -EGD 2009:  gastritis\par -Rt. hemicolectomy 2013 ( [facility]..path revealed an inflammatory process) \par -Colonoscopy 2010: hemorrhoids

## 2023-03-06 ENCOUNTER — RESULT REVIEW (OUTPATIENT)
Age: 63
End: 2023-03-06

## 2023-03-08 ENCOUNTER — RESULT REVIEW (OUTPATIENT)
Age: 63
End: 2023-03-08

## 2023-03-09 ENCOUNTER — APPOINTMENT (OUTPATIENT)
Dept: GASTROENTEROLOGY | Facility: HOSPITAL | Age: 63
End: 2023-03-09

## 2023-05-24 ENCOUNTER — NON-APPOINTMENT (OUTPATIENT)
Age: 63
End: 2023-05-24

## 2023-06-05 ENCOUNTER — APPOINTMENT (OUTPATIENT)
Dept: BREAST CENTER | Facility: CLINIC | Age: 63
End: 2023-06-05
Payer: COMMERCIAL

## 2023-06-05 DIAGNOSIS — R92.8 OTHER ABNORMAL AND INCONCLUSIVE FINDINGS ON DIAGNOSTIC IMAGING OF BREAST: ICD-10-CM

## 2023-06-05 DIAGNOSIS — Z86.000 PERSONAL HISTORY OF IN-SITU NEOPLASM OF BREAST: ICD-10-CM

## 2023-06-05 PROCEDURE — 99204 OFFICE O/P NEW MOD 45 MIN: CPT

## 2023-06-05 RX ORDER — ADHESIVE TAPE 3"X 2.3 YD
50 MCG TAPE, NON-MEDICATED TOPICAL
Refills: 0 | Status: ACTIVE | COMMUNITY

## 2023-06-05 NOTE — HISTORY OF PRESENT ILLNESS
[FreeTextEntry1] : 63-year-old postmenopausal woman presents after screening mammogram showed fibroglandular density tissue and in the right breast 12 o'clock position anterior third a 3 mm area of calcifications, BI-RADS 4.  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  Patient denies any breast masses or nipple discharge.  This was the first breast biopsy she is ever had and she has no family history of breast cancer.

## 2023-06-12 ENCOUNTER — RESULT REVIEW (OUTPATIENT)
Age: 63
End: 2023-06-12

## 2023-06-14 ENCOUNTER — RESULT REVIEW (OUTPATIENT)
Age: 63
End: 2023-06-14

## 2023-06-15 ENCOUNTER — RESULT REVIEW (OUTPATIENT)
Age: 63
End: 2023-06-15

## 2023-06-15 ENCOUNTER — APPOINTMENT (OUTPATIENT)
Dept: BREAST CENTER | Facility: HOSPITAL | Age: 63
End: 2023-06-15

## 2023-06-22 ENCOUNTER — APPOINTMENT (OUTPATIENT)
Dept: BREAST CENTER | Facility: CLINIC | Age: 63
End: 2023-06-22

## 2023-06-23 ENCOUNTER — APPOINTMENT (OUTPATIENT)
Dept: BREAST CENTER | Facility: CLINIC | Age: 63
End: 2023-06-23
Payer: COMMERCIAL

## 2023-06-23 PROCEDURE — 99024 POSTOP FOLLOW-UP VISIT: CPT

## 2023-06-23 NOTE — HISTORY OF PRESENT ILLNESS
[FreeTextEntry1] : 6/23 right partial mastectomy for intermediate grade ductal carcinoma in situ, clear margins\par \par Patient tolerated the surgery well, pain under control.\par \par 63-year-old postmenopausal woman presents after screening mammogram showed fibroglandular density tissue and in the right breast 12 o'clock position anterior third a 3 mm area of calcifications, BI-RADS 4.  Patient underwent a right stereotactic core biopsy which revealed an ER/UT positive intermediate to high-grade ductal carcinoma in situ.  Patient denies any breast masses or nipple discharge.  This was the first breast biopsy she is ever had and she has no family history of breast cancer.

## 2023-06-28 ENCOUNTER — RESULT REVIEW (OUTPATIENT)
Age: 63
End: 2023-06-28

## 2023-06-28 ENCOUNTER — APPOINTMENT (OUTPATIENT)
Dept: HEMATOLOGY ONCOLOGY | Facility: CLINIC | Age: 63
End: 2023-06-28
Payer: COMMERCIAL

## 2023-06-28 ENCOUNTER — APPOINTMENT (OUTPATIENT)
Dept: RADIATION ONCOLOGY | Facility: CLINIC | Age: 63
End: 2023-06-28
Payer: COMMERCIAL

## 2023-06-28 VITALS
OXYGEN SATURATION: 97 % | RESPIRATION RATE: 18 BRPM | SYSTOLIC BLOOD PRESSURE: 134 MMHG | HEART RATE: 87 BPM | DIASTOLIC BLOOD PRESSURE: 83 MMHG | HEIGHT: 64 IN

## 2023-06-28 VITALS
SYSTOLIC BLOOD PRESSURE: 130 MMHG | WEIGHT: 131.13 LBS | DIASTOLIC BLOOD PRESSURE: 82 MMHG | RESPIRATION RATE: 16 BRPM | HEART RATE: 83 BPM | TEMPERATURE: 96.1 F | OXYGEN SATURATION: 98 % | HEIGHT: 64 IN | BODY MASS INDEX: 22.39 KG/M2

## 2023-06-28 DIAGNOSIS — Z80.7 FAMILY HISTORY OF OTHER MALIGNANT NEOPLASMS OF LYMPHOID, HEMATOPOIETIC AND RELATED TISSUES: ICD-10-CM

## 2023-06-28 DIAGNOSIS — Z80.42 FAMILY HISTORY OF MALIGNANT NEOPLASM OF PROSTATE: ICD-10-CM

## 2023-06-28 DIAGNOSIS — Z80.41 FAMILY HISTORY OF MALIGNANT NEOPLASM OF OVARY: ICD-10-CM

## 2023-06-28 PROCEDURE — 99205 OFFICE O/P NEW HI 60 MIN: CPT | Mod: 25

## 2023-06-28 PROCEDURE — 36415 COLL VENOUS BLD VENIPUNCTURE: CPT

## 2023-06-28 RX ORDER — VALACYCLOVIR HYDROCHLORIDE 1 G/1
TABLET, FILM COATED ORAL
Refills: 0 | Status: ACTIVE | COMMUNITY

## 2023-06-28 NOTE — ASSESSMENT
[FreeTextEntry1] : #DCIS\par We have reviewed the diagnosis, prognosis and natural history of DCIS.\par We have reviewed the imaging and pathology reports personally and discussed the findings with the patient.\par We have discussed that she has already had a lumpectomy which is usually followed by radiation given the findings of DCIS. She has an appt with Radiation Oncology to discuss the role of Radiation.\par We discussed the primary role of systemic treatment is to reduce the risk of invasive breast cancer and that endocrine therapy reduces recurrence rates, but has not been shown to improve survival.\par The NSABP B-35 trial demonstrated that anastrozole resulted in a decreased rate of breast cancer events at 10 years compared with tamoxifen in postmenopausal women with hormone receptor-positive DCIS who underwent BCT. Anastrozole had a lower incidence of subsequent breast cancer events (recurrent DCIS or subsequent invasive breast cancer) including a lower rate of invasive breast cancer, Improved estimated breast cancer-free survival at 10 years, however no significant difference in either disease-free survival or overall survival.\par We will obtain a dexa scan to establish baseline bone density prior to starting Anastrozole. If normal bone density, would recommend Anastrozole, 1mg PO q day for a minimum of 5 years given that DCIS is ER/MT+ and she is post menopausal. We have reviewed the side effects of Anastrozole to include but are not limited to hot flashes, mood swings, vaginal dryness, decreased labido, arthralgias, bone pain, thinning of the bones including osteopenia/osteoporosis. She will take this with Ca++ 1200 mg PO q day and Vit D 800 IU daily to protect her bone health.\par \par \par

## 2023-06-28 NOTE — HISTORY OF PRESENT ILLNESS
[de-identified] : Ms. Rodriguez is a 63 year old postmenopausal female with no PMH that presents for initial consultation referred by Dr. Sanders for newly diagnosed R breast DCIS. \par \par Oncological History: \par \par s/p screening mammogram 23 revealing R breast calcifications \par \par s/p diagnostic mammogram 23 revealing fibroglandular density tissue and in the right breast 12 o'clock position anterior third a 3 mm area of calcifications. mildly suspicious grouping of calcifications in the R breast. stereotactic bx recommended. BI-RADS 4. \par \par s/p right stereotactic core biopsy 23 pathology revealing R breast 12:00 DCIS nuclear grade 2-3/3 solid and cribiform type with comedonecrosis and microcalcifications ER >95%, VT 1--15%\par \par s/p R partial mastectomy 23 pathology revealing: \par \par A. RIGHT PARTIAL MASTECTOMY, LONG LATERAL, SHORT SUPERIOR:\par - Ductal carcinoma in situ, intermediate nuclear grade, solid and cribriform patterns, with\par lobular extension\par - DCIS involves posterior aspect (final posterior margin is negative, see part B)\par - Microcalcifications associated with benign glands and DCIS (seen in prior core biopsy)\par - Prior biopsy site changes\par - See comment and synoptic report\par \par B. DEEP SEGMENT, CLIP DAMON NEW MARGIN SIDE, RIGHT BREAST:\par - Benign fibroadipose tissue\par \par C. ANTERIOR SEGMENT, CLIP DAMON NEW MARGIN SIDE, RIGHT BREAST:\par - Benign breast tissue\par \par D. SUPERIOR SEGMENT, CLIP DAMON NEW MARGIN SIDE, RIGHT BREAST:\par - Ductal carcinoma in situ, intermediate nuclear grade, solid type, with lobular extension\par - Prior biopsy site changes\par - DCIS is 2.5 mm from inked margin\par - See comment\par \par E. MEDIAL SEGMENT, CLIP DAMON NEW MARGIN SIDE, RIGHT BREAST:\par - Benign breast tissue\par \par F. INFERIOR SEGMENT, CLIP DAMON NEW MARGIN SIDE, RIGHT BREAST:\par - Benign breast tissue\par \par G. LATERAL SEGMENT, CLIP DAMON NEW MARGIN SIDE, RIGHT BREAST:\par - Benign breast tissue\par \par H.  INFERIOR LATERAL MARGIN, CLIP DAMON NEW MARGIN SIDE, RIGHT BREAST:\par - Benign breast tissue\par \par Breast Cancer Risk Factors:\par Menarche: 17\par Date of LMP: at time of hysterectomy in \par Menopause: yes \par \par Age at first live birth: 31\par Nursed: yes\par Hysterectomy: yes\par Oophorectomy: no \par OCP: 3 mos\par HRT: no\par Last pap/pelvic exam: Dr. Zhou within last year no abmormal PAP\par Related family history half sister ovarian ca, maternal aunt multiple myeloma\par Ashkenazi: no\par BRCA testing: no\par \par

## 2023-06-30 NOTE — LETTER CLOSING
[Consult Closing:] : Thank you for allowing me to participate in the care of this patient.  If you have any questions, please do not hesitate to contact me. [Sincerely yours,] : Sincerely yours, [FreeTextEntry3] : Mignon Davis MD\par

## 2023-06-30 NOTE — PHYSICAL EXAM
[Normal] : oriented to person, place and time, the affect was normal, the mood was normal and not anxious [de-identified] : s/p right partial mastectomy

## 2023-06-30 NOTE — DISEASE MANAGEMENT
[0] : 0 [Pathological] : TNM Stage: p [FreeTextEntry4] : Intermediate to High-grade ER/CT+ DCIS  of the right breast [TTNM] : is [NTNM] : X [MTNM] : X

## 2023-06-30 NOTE — VITALS
[Maximal Pain Intensity: 3/10] : 3/10 [Least Pain Intensity: 0/10] : 0/10 [90: Able to carry normal activity; minor signs or symptoms of disease.] : 90: Able to carry normal activity; minor signs or symptoms of disease.  [Date: ____________] : Patient's last distress assessment performed on [unfilled]. [3 - Distress Level] : Distress Level: 3

## 2023-06-30 NOTE — HISTORY OF PRESENT ILLNESS
[FreeTextEntry1] : Ms. Rodriguez is a 63 year old postmenopausal female that presents for initial consultation referred by Dr. Sanders for newly diagnosed ER/IA+ DCIS of the right breast.  \par \par Ms. Rodriguez underwent a mammogram 5/2/23 that revealed in the right breast 12 o'clock position anterior third a 3 mm area of calcifications.  Stereotactic bx recommended. BI-RADS 4. \par Ms. Rodriguez then underwent a right stereotactic core biopsy on 5/17/23 pathology revealed at the 12:00 position, DCIS nuclear grade 2-3/3 solid and cribiform type with comedonecrosis and microcalcifications ER >95%, IA 1--15%\par \par \par On 6/23/23,  underwent a right partial mastectomy performed by Dr Sanders. Pathology revealed :\par  RIGHT PARTIAL MASTECTOMY, LONG LATERAL, SHORT SUPERIOR:\par - Ductal carcinoma in situ, intermediate nuclear grade, solid and cribriform patterns, with\par lobular extension\par - DCIS involved posterior aspect (final posterior margin was negative)\par - Microcalcifications associated with benign glands and DCIS (seen in prior core biopsy)\par - Prior biopsy site changes\par \par  SUPERIOR SEGMENT, CLIP DAMON NEW MARGIN SIDE, RIGHT BREAST:\par - Ductal carcinoma in situ, intermediate nuclear grade, solid type, with lobular extension\par - Prior biopsy site changes\par - DCIS was 2.5 mm from inked margin\par \par Ms. Rodriguez tolerated the procedure well without significant side effects or complications, she presents today to discuss adjuvant radiation to the right breast.\par

## 2023-07-05 ENCOUNTER — RESULT REVIEW (OUTPATIENT)
Age: 63
End: 2023-07-05

## 2023-07-26 ENCOUNTER — NON-APPOINTMENT (OUTPATIENT)
Age: 63
End: 2023-07-26

## 2023-07-26 VITALS
OXYGEN SATURATION: 98 % | RESPIRATION RATE: 18 BRPM | DIASTOLIC BLOOD PRESSURE: 83 MMHG | SYSTOLIC BLOOD PRESSURE: 128 MMHG | HEART RATE: 84 BPM

## 2023-07-26 NOTE — REVIEW OF SYSTEMS
[Fatigue: Grade 1 - Fatigue relieved by rest] : Fatigue: Grade 1 - Fatigue relieved by rest [Breast Pain: Grade 0] : Breast Pain: Grade 0 [Alopecia: Grade 0] : Alopecia: Grade 0 [Pruritus: Grade 0] : Pruritus: Grade 0 [Skin Atrophy: Grade 0] : Skin Atrophy: Grade 0 [Skin Hyperpigmentation: Grade 0] : Skin Hyperpigmentation: Grade 0 [Skin Induration: Grade 0] : Skin Induration: Grade 0 [Dermatitis Radiation: Grade 0] : Dermatitis Radiation: Grade 0

## 2023-07-28 NOTE — DISEASE MANAGEMENT
[Pathological] : TNM Stage: p [0] : 0 [TTNM] : is [NTNM] : X [MTNM] : X [de-identified] : [id number] [[place] [id number]] : 2163 [de-identified] : right breast

## 2023-07-28 NOTE — HISTORY OF PRESENT ILLNESS
[FreeTextEntry1] : Ms. Rodriguez is a 63 year old female diagnosed with intermediate to high-grade, ER/MI+ DCIS of the right breast. She has completed 5/16 fractions to a total dose of 1325/4240 cGy to the right breast. \par \par 07/26/23 Pt presents today for OTV. Ms. Rodriguez states she feels well overall. She does note some fatigue. Denies any pain or skin alteration, states she uses olive oil on the breast nightly.

## 2023-08-01 ENCOUNTER — NON-APPOINTMENT (OUTPATIENT)
Age: 63
End: 2023-08-01

## 2023-08-01 VITALS
OXYGEN SATURATION: 99 % | SYSTOLIC BLOOD PRESSURE: 129 MMHG | DIASTOLIC BLOOD PRESSURE: 93 MMHG | HEART RATE: 80 BPM | RESPIRATION RATE: 16 BRPM

## 2023-08-01 NOTE — DISEASE MANAGEMENT
[Pathological] : TNM Stage: p [0] : 0 [TTNM] : is [NTNM] : X [MTNM] : X [de-identified] : [id number] [[place] [id number]] : [id number] [de-identified] : right breast

## 2023-08-01 NOTE — REVIEW OF SYSTEMS
[Fatigue: Grade 0] : Fatigue: Grade 0 [Breast Pain: Grade 0] : Breast Pain: Grade 0 [Alopecia: Grade 0] : Alopecia: Grade 0 [Pruritus: Grade 0] : Pruritus: Grade 0 [Skin Atrophy: Grade 0] : Skin Atrophy: Grade 0 [Skin Hyperpigmentation: Grade 0] : Skin Hyperpigmentation: Grade 0 [Skin Induration: Grade 0] : Skin Induration: Grade 0 [Dermatitis Radiation: Grade 0] : Dermatitis Radiation: Grade 0

## 2023-08-01 NOTE — HISTORY OF PRESENT ILLNESS
[FreeTextEntry1] : Ms. KUNZ is a 63 year year old female diagnosed with intermediate to high-grade, ER/WA+ DCIS of the right breast. She has completed 9/16 fractions to a total dose of 2385/4240 cGy.   08/01/2023 Ms. KUNZ presents today for OTV. Pt states she feels well overall. She denies any pain, fatigue, or skin irritation. She continues to apply olive oil to the treatment site.

## 2023-08-08 ENCOUNTER — NON-APPOINTMENT (OUTPATIENT)
Age: 63
End: 2023-08-08

## 2023-08-08 VITALS
HEART RATE: 96 BPM | DIASTOLIC BLOOD PRESSURE: 90 MMHG | RESPIRATION RATE: 18 BRPM | SYSTOLIC BLOOD PRESSURE: 135 MMHG | OXYGEN SATURATION: 98 %

## 2023-08-08 NOTE — REVIEW OF SYSTEMS
[Constipation: Grade 0] : Constipation: Grade 0 [Diarrhea: Grade 0] : Diarrhea: Grade 0 [Nausea: Grade 0] : Nausea: Grade 0 [Vomiting: Grade 0] : Vomiting: Grade 0 [Fatigue: Grade 0] : Fatigue: Grade 0 [Breast Pain: Grade 0] : Breast Pain: Grade 0 [Alopecia: Grade 0] : Alopecia: Grade 0 [Pruritus: Grade 0] : Pruritus: Grade 0 [Skin Atrophy: Grade 0] : Skin Atrophy: Grade 0 [Skin Hyperpigmentation: Grade 0] : Skin Hyperpigmentation: Grade 0 [Skin Induration: Grade 0] : Skin Induration: Grade 0 [Dermatitis Radiation: Grade 0] : Dermatitis Radiation: Grade 0

## 2023-08-09 NOTE — DISEASE MANAGEMENT
[Pathological] : TNM Stage: p [0] : 0 [TTNM] : is [NTNM] : X [MTNM] : X [de-identified] : [id number] [de-identified] : right breast [[place] [id number]] : [id number]

## 2023-08-09 NOTE — HISTORY OF PRESENT ILLNESS
[FreeTextEntry1] : Ms. KUNZ is a 63-year-old female diagnosed with intermediate to high-grade ER/AR+ DCIS of the right breast. She has completed 14/16 fractions to a total dose of 3710/4240 cGy.   08/08/2023 Ms. KUNZ presents today for OTV. Pt states she feels well overall. She denies any pain or fatigue. Her skin is intact and without discoloration, though she does report some tightness. She continues to apply olive oil to the area.

## 2023-09-08 NOTE — HISTORY OF PRESENT ILLNESS
[FreeTextEntry1] : Ms. Rodriguez is a 63-year-old postmenopausal female diagnosed with ER/DE+ DCIS of the right breast s/p right partial mastectomy and radiation therapy.   Ms. Rodriguez underwent a mammogram 5/2/23 that revealed in the right breast 12 o'clock position anterior third a 3 mm area of calcifications. Stereotactic bx recommended. BI-RADS 4.  Ms. Rodriguez then underwent a right stereotactic core biopsy on 5/17/23 pathology revealed at the 12:00 position, DCIS nuclear grade 2-3/3 solid and cribiform type with comedonecrosis and microcalcifications ER >95%, DE 1--15%  On 6/23/23,  underwent a right partial mastectomy performed by Dr Sanders. Pathology revealed:  RIGHT PARTIAL MASTECTOMY, LONG LATERAL, SHORT SUPERIOR: - Ductal carcinoma in situ, intermediate nuclear grade, solid and cribriform patterns, with lobular extension - DCIS involved posterior aspect (final posterior margin was negative) - Microcalcifications associated with benign glands and DCIS (seen in prior core biopsy) - Prior biopsy site changes SUPERIOR SEGMENT, CLIP DAMON NEW MARGIN SIDE, RIGHT BREAST: - Ductal carcinoma in situ, intermediate nuclear grade, solid type, with lobular extension - Prior biopsy site changes - DCIS was 2.5 mm from inked margin  Ms. Rodriguez completed RT to the right breast to a dose of 4240 cGy on 8/10/23.   9/14/23 Ms. Rodriguez presents today for post-treatment evaluation. She was started on Anastrozole by Dr. Mei who she will see again 9/20.

## 2023-09-08 NOTE — DISEASE MANAGEMENT
[Pathological] : TNM Stage: p [0] : 0 [FreeTextEntry4] : Intermediate to High-grade ER/ND+ DCIS of the right breast [TTNM] : is [NTNM] : X [MTNM] : X

## 2023-09-14 ENCOUNTER — APPOINTMENT (OUTPATIENT)
Dept: RADIATION ONCOLOGY | Facility: CLINIC | Age: 63
End: 2023-09-14
Payer: COMMERCIAL

## 2023-09-14 ENCOUNTER — APPOINTMENT (OUTPATIENT)
Dept: BREAST CENTER | Facility: CLINIC | Age: 63
End: 2023-09-14

## 2023-09-14 VITALS
DIASTOLIC BLOOD PRESSURE: 87 MMHG | RESPIRATION RATE: 19 BRPM | SYSTOLIC BLOOD PRESSURE: 128 MMHG | HEART RATE: 87 BPM | OXYGEN SATURATION: 100 %

## 2023-09-14 PROCEDURE — 99024 POSTOP FOLLOW-UP VISIT: CPT

## 2023-09-20 ENCOUNTER — RESULT REVIEW (OUTPATIENT)
Age: 63
End: 2023-09-20

## 2023-09-20 ENCOUNTER — APPOINTMENT (OUTPATIENT)
Dept: HEMATOLOGY ONCOLOGY | Facility: CLINIC | Age: 63
End: 2023-09-20
Payer: COMMERCIAL

## 2023-09-20 VITALS
WEIGHT: 134.44 LBS | TEMPERATURE: 97 F | HEART RATE: 85 BPM | HEIGHT: 64 IN | DIASTOLIC BLOOD PRESSURE: 82 MMHG | OXYGEN SATURATION: 99 % | SYSTOLIC BLOOD PRESSURE: 133 MMHG | RESPIRATION RATE: 16 BRPM | BODY MASS INDEX: 22.95 KG/M2

## 2023-09-20 DIAGNOSIS — Z17.0 ESTROGEN RECEPTOR POSITIVE STATUS [ER+]: ICD-10-CM

## 2023-09-20 PROCEDURE — 36415 COLL VENOUS BLD VENIPUNCTURE: CPT

## 2023-09-20 PROCEDURE — 99214 OFFICE O/P EST MOD 30 MIN: CPT | Mod: 25

## 2023-09-21 PROBLEM — Z17.0 ER+ (ESTROGEN RECEPTOR POSITIVE STATUS): Status: ACTIVE | Noted: 2023-06-28

## 2023-09-28 ENCOUNTER — APPOINTMENT (OUTPATIENT)
Dept: BREAST CENTER | Facility: CLINIC | Age: 63
End: 2023-09-28
Payer: COMMERCIAL

## 2023-09-28 VITALS
SYSTOLIC BLOOD PRESSURE: 133 MMHG | BODY MASS INDEX: 23 KG/M2 | HEART RATE: 83 BPM | WEIGHT: 134 LBS | OXYGEN SATURATION: 98 % | DIASTOLIC BLOOD PRESSURE: 79 MMHG

## 2023-09-28 PROCEDURE — 99213 OFFICE O/P EST LOW 20 MIN: CPT

## 2023-12-04 RX ORDER — ANASTROZOLE TABLETS 1 MG/1
1 TABLET ORAL
Qty: 90 | Refills: 1 | Status: ACTIVE | COMMUNITY
Start: 2023-06-28 | End: 1900-01-01

## 2023-12-14 ENCOUNTER — APPOINTMENT (OUTPATIENT)
Dept: RADIATION ONCOLOGY | Facility: CLINIC | Age: 63
End: 2023-12-14
Payer: COMMERCIAL

## 2023-12-14 PROCEDURE — 99214 OFFICE O/P EST MOD 30 MIN: CPT

## 2023-12-14 NOTE — REVIEW OF SYSTEMS
[Negative] : Allergic/Immunologic [Skin Hyperpigmentation: Grade 0] : Skin Hyperpigmentation: Grade 0 [Dermatitis Radiation: Grade 0] : Dermatitis Radiation: Grade 0

## 2023-12-18 ENCOUNTER — RESULT REVIEW (OUTPATIENT)
Age: 63
End: 2023-12-18

## 2023-12-18 ENCOUNTER — APPOINTMENT (OUTPATIENT)
Dept: HEMATOLOGY ONCOLOGY | Facility: CLINIC | Age: 63
End: 2023-12-18
Payer: COMMERCIAL

## 2023-12-18 VITALS
DIASTOLIC BLOOD PRESSURE: 84 MMHG | HEIGHT: 64 IN | WEIGHT: 133.25 LBS | SYSTOLIC BLOOD PRESSURE: 138 MMHG | HEART RATE: 102 BPM | RESPIRATION RATE: 16 BRPM | OXYGEN SATURATION: 96 % | TEMPERATURE: 96.9 F | BODY MASS INDEX: 22.75 KG/M2

## 2023-12-18 DIAGNOSIS — E83.52 HYPERCALCEMIA: ICD-10-CM

## 2023-12-18 DIAGNOSIS — M85.80 OTHER SPECIFIED DISORDERS OF BONE DENSITY AND STRUCTURE, UNSPECIFIED SITE: ICD-10-CM

## 2023-12-18 PROCEDURE — 99214 OFFICE O/P EST MOD 30 MIN: CPT | Mod: 25

## 2023-12-18 PROCEDURE — 36415 COLL VENOUS BLD VENIPUNCTURE: CPT

## 2023-12-18 NOTE — ASSESSMENT
[FreeTextEntry1] : #DCIS - We have reviewed the diagnosis, prognosis and natural history of DCIS. - We have reviewed the imaging and pathology reports personally and discussed the findings with the patient. - We have discussed that she has already had a lumpectomy which is usually followed by radiation given the findings of DCIS. She has an appt with Radiation Oncology to discuss the role of Radiation. - We discussed the primary role of systemic treatment is to reduce the risk of invasive breast cancer and that endocrine therapy reduces recurrence rates, but has not been shown to improve survival. - The NSABP B-35 trial demonstrated that anastrozole resulted in a decreased rate of breast cancer events at 10 years compared with tamoxifen in postmenopausal women with hormone receptor-positive DCIS who underwent BCT. Anastrozole had a lower incidence of subsequent breast cancer events (recurrent DCIS or subsequent invasive breast cancer) including a lower rate of invasive breast cancer, Improved estimated breast cancer-free survival at 10 years, however no significant difference in either disease-free survival or overall survival. We will obtain a dexa scan to establish baseline bone density prior to starting Anastrozole. If normal bone density, would recommend Anastrozole, 1mg PO q day for a minimum of 5 years given that DCIS is ER/W[place]+ and she is post menopausal. We have reviewed the side effects of Anastrozole to include but are not limited to hot flashes, mood swings, vaginal dryness, decreased labido, arthralgias, bone pain, thinning of the bones including osteopenia/osteoporosis. She will take this with Ca++ 1200 mg PO q day and Vit D 800 IU daily to protect her bone health. - 9/20/23 vs and CBC reviewed; WBC 4.69, hgb 13.5, plt 223. s/p completion of RT to R breast 8/10/23. She started on Anastrozole 9/3/23 tolerating well. Continue. Encouraged vit D and ca++ however previous labs and CMP today with hypercalcemia, will hold off. s/p follow up with Dr. Davis 9/13/23 note reviewed. Follow up with breast surgery Dr. Sanders 9/28/23. Breast imaging per Dr. Sanders - 12/18/23 vs and CBC reviewed; WBC 5.83, hgb 12.6, plt 258. Remains on Anastrazole. s/p follow up with Dr. Sanders, note reviewed imaging due 5/2024. s/p follow up with Dr. Davis 12/14/23. Will be starting PT for R breast scar tissue. Remains off ca++ and vit D due to hypercalcemia. Has endo in [facility] occasionally. Hypercalcemia work up sent today  #Osteopenia - s/p DEXA 7/2023 revealing L spine -0.5, L femoral neck -1.2, L hip 0.3 - Reviewed will need to monitor DEXA q2y with AI use as it can contribute to bone loss - Recommend ca++, vit D and weight bearing exercises - as above labs from 6/2023 and 9/2023 reveal hypercalcemia without elevation to alk phos. Hypercalcemia work up sent today   #Hypercalemia - 6/2023 labs Ca++ 11.0, corrected 10.6 - 9/2023 labs Ca++ 11.5, corrected 11, recommend holding vit d and not starting ca++ as above. Will complete hypercalcemia work up at next visit. Alk phos normal - 12/18/23 ca++ 10.9 today since being off calcium and d. corrected 10.5. Hypercalcemia work up sent today   RTC in 2-3 mos with CBC with diff, CMP, vit D, ionized ca++, PTH, PTHrp  Case and mangaement discussed with Dr. Mei.

## 2023-12-18 NOTE — HISTORY OF PRESENT ILLNESS
[de-identified] : Ms. Rodriguez is a 63 year old postmenopausal female with no PMH that presents for initial consultation referred by Dr. Sanders for newly diagnosed R breast DCIS.   Oncological History:   s/p screening mammogram 23 revealing R breast calcifications   s/p diagnostic mammogram 23 revealing fibroglandular density tissue and in the right breast 12 o'clock position anterior third a 3 mm area of calcifications. mildly suspicious grouping of calcifications in the R breast. stereotactic bx recommended. BI-RADS 4.   s/p right stereotactic core biopsy 23 pathology revealing R breast 12:00 DCIS nuclear grade 2-3/3 solid and cribiform type with comedonecrosis and microcalcifications ER >95%, NJ 1--15%  s/p R partial mastectomy 23 pathology revealing:   A. RIGHT PARTIAL MASTECTOMY, LONG LATERAL, SHORT SUPERIOR: - Ductal carcinoma in situ, intermediate nuclear grade, solid and cribriform patterns, with lobular extension - DCIS involves posterior aspect (final posterior margin is negative, see part B) - Microcalcifications associated with benign glands and DCIS (seen in prior core biopsy) - Prior biopsy site changes - See comment and synoptic report  B. DEEP SEGMENT, CLIP DAMON NEW MARGIN SIDE, RIGHT BREAST: - Benign fibroadipose tissue  C. ANTERIOR SEGMENT, CLIP DAMON NEW MARGIN SIDE, RIGHT BREAST: - Benign breast tissue  D. SUPERIOR SEGMENT, CLIP DAMON NEW MARGIN SIDE, RIGHT BREAST: - Ductal carcinoma in situ, intermediate nuclear grade, solid type, with lobular extension - Prior biopsy site changes - DCIS is 2.5 mm from inked margin - See comment  E. MEDIAL SEGMENT, CLIP DAMON NEW MARGIN SIDE, RIGHT BREAST: - Benign breast tissue  F. INFERIOR SEGMENT, CLIP DAMON NEW MARGIN SIDE, RIGHT BREAST: - Benign breast tissue  G. LATERAL SEGMENT, CLIP DAMON NEW MARGIN SIDE, RIGHT BREAST: - Benign breast tissue  H.  INFERIOR LATERAL MARGIN, CLIP DAMON NEW MARGIN SIDE, RIGHT BREAST: - Benign breast tissue  Breast Cancer Risk Factors: Menarche: 17 Date of LMP: at time of hysterectomy in  Menopause: yes   Age at first live birth: 31 Nursed: yes Hysterectomy: yes Oophorectomy: no  OCP: 3 mos HRT: no Last pap/pelvic exam: Dr. Zhou within last year no abmormal PAP Related family history half sister ovarian ca, maternal aunt multiple myeloma Ashkenazi: no BRCA testing: no   [de-identified] : Patient seen and examined today for routine follow up for the management of DCIS. She is now s/p completion of RT to the R breast 8/10/23. She started Anastrazole 9/3/23. Tolerating well with no issues today. She stopped taking ca++ and vit D due to elevated ca++ at last visit. She is s/p folow up with Dr. Sanders 9/2023 next imaging 5/2024. s/p follow up with Dr. Davis 12/14/23. She is planning to do PT for the R breast scar tissue near surgical site. Denies fevers/chills, HA, dizziness, SOB, cough, CP, palpitations, abd pain, n/v/d, swelling to extremities, back pain, hematuria, BRBPR, abnormal vaginal bleeding.

## 2023-12-24 NOTE — HISTORY OF PRESENT ILLNESS
[FreeTextEntry1] : Ms. Rodriguez is a 63-year-old female diagnosed with ER/LA+ DCIS of the right breast s/p right partial mastectomy and radiation therapy. She is present today for her routine follow-up.   Ms. Rodriguez underwent a mammogram 5/2/23 that revealed in the right breast 12 o'clock position anterior third a 3 mm area of calcifications. Stereotactic bx recommended. BI-RADS 4.  Ms. Rodriguez then underwent a right stereotactic core biopsy on 5/17/23 pathology revealed at the 12:00 position, DCIS nuclear grade 2-3/3 solid and cribiform type with comedonecrosis and microcalcifications ER >95%, LA 1--15%  On 6/23/23,  underwent a right partial mastectomy performed by Dr Sanders. Pathology revealed:  RIGHT PARTIAL MASTECTOMY, LONG LATERAL, SHORT SUPERIOR: - Ductal carcinoma in situ, intermediate nuclear grade, solid and cribriform patterns, with lobular extension - DCIS involved posterior aspect (final posterior margin was negative) - Microcalcifications associated with benign glands and DCIS (seen in prior core biopsy) - Prior biopsy site changes SUPERIOR SEGMENT, CLIP DAMON NEW MARGIN SIDE, RIGHT BREAST: - Ductal carcinoma in situ, intermediate nuclear grade, solid type, with lobular extension - Prior biopsy site changes - DCIS was 2.5 mm from inked margin  Ms. Rodriguez was treated with adjuvant radiation to the right breast to a dose of 4240 cGy completed on 8/10/23.   Ms. Rodriguez presents today for routine follow-up. She reports she is doing well. She offers complaints of limited ROM to the right arm and breast tightness s/p completion. She continues to take Anastrozole and reports no noticeable side effects. She had follow-up for Dr. Sanders in September. Mammo and US is scheduled for May 2024. She has follow-up scheduled with Dr. Mei on Monday, 12/18/23.  While undergoing treatment, Ms. Rodriguez unfortunately lost her mother so she is still in the grieving process, especially poignant during the holidays.

## 2023-12-24 NOTE — DISEASE MANAGEMENT
[FreeTextEntry4] : Intermediate to High-grade ER/VA+ DCIS of the right breast [TTNM] : is [NTNM] : X [MTNM] : X

## 2023-12-24 NOTE — PHYSICAL EXAM
[Normal] : no joint swelling, no clubbing or cyanosis of the fingernails and muscle strength and tone were normal [de-identified] : s/p right partial mastectomy

## 2024-01-23 DIAGNOSIS — M25.60 STIFFNESS OF UNSPECIFIED JOINT, NOT ELSEWHERE CLASSIFIED: ICD-10-CM

## 2024-03-27 ENCOUNTER — APPOINTMENT (OUTPATIENT)
Dept: RADIATION ONCOLOGY | Facility: CLINIC | Age: 64
End: 2024-03-27
Payer: COMMERCIAL

## 2024-03-27 VITALS
SYSTOLIC BLOOD PRESSURE: 134 MMHG | OXYGEN SATURATION: 97 % | HEART RATE: 84 BPM | RESPIRATION RATE: 18 BRPM | DIASTOLIC BLOOD PRESSURE: 84 MMHG

## 2024-03-27 PROCEDURE — 99214 OFFICE O/P EST MOD 30 MIN: CPT

## 2024-03-27 NOTE — VITALS
[Maximal Pain Intensity: 0/10] : 0/10 [Least Pain Intensity: 0/10] : 0/10 [ECOG Performance Status: 0 - Fully active, able to carry on all pre-disease performance without restriction] : Performance Status: 0 - Fully active, able to carry on all pre-disease performance without restriction [100: Normal, no complaints, no evidence of disease.] : 100: Normal, no complaints, no evidence of disease.

## 2024-03-31 NOTE — PHYSICAL EXAM
[Normal] : oriented to person, place and time, the affect was normal, the mood was normal and not anxious [de-identified] : s/p right partial mastectomy

## 2024-03-31 NOTE — DISEASE MANAGEMENT
[Pathological] : TNM Stage: p [0] : 0 [FreeTextEntry4] : Intermediate to High-grade ER/MT+ DCIS of the right breast [MTNM] : X [TTNM] : is [NTNM] : X

## 2024-03-31 NOTE — HISTORY OF PRESENT ILLNESS
Orthopedic [FreeTextEntry1] : Ms. Rodriguez is a 63-year-old female diagnosed with ER/SD+ DCIS of the right breast s/p right partial mastectomy and radiation therapy. She is present today for her routine follow-up.   Ms. Rodriguez underwent a mammogram 5/2/23 that revealed in the right breast 12 o'clock position anterior third a 3 mm area of calcifications. Stereotactic bx recommended. BI-RADS 4.  Ms. Rodriguez then underwent a right stereotactic core biopsy on 5/17/23 pathology revealed at the 12:00 position, DCIS nuclear grade 2-3/3 solid and cribiform type with comedonecrosis and microcalcifications ER >95%, SD 1--15%  On 6/23/23,  underwent a right partial mastectomy performed by Dr Sanders. Pathology revealed:  RIGHT PARTIAL MASTECTOMY, LONG LATERAL, SHORT SUPERIOR: - Ductal carcinoma in situ, intermediate nuclear grade, solid and cribriform patterns, with lobular extension - DCIS involved posterior aspect (final posterior margin was negative) - Microcalcifications associated with benign glands and DCIS (seen in prior core biopsy) - Prior biopsy site changes SUPERIOR SEGMENT, CLIP DAMON NEW MARGIN SIDE, RIGHT BREAST: - Ductal carcinoma in situ, intermediate nuclear grade, solid type, with lobular extension - Prior biopsy site changes - DCIS was 2.5 mm from inked margin  Ms. Rodriguez was treated with adjuvant radiation to the right breast to a dose of 4240 cGy completed on 8/10/23. She tolerated treatment well but suffered the loss of her mother while on treatment.  Ms. Rodriguez presents today for routine follow-up. She offers no new symptoms or complaints at this time. She offers no new symptoms at this time. She reports mild breast discomfort rating it a 2 / 10 on the pain scale. She continues to take Anastrozole and reports no noticeable side effects. She has follow-up for Dr. Sanders in May 2024. Mammo and US is scheduled for May 2024. She has follow-up scheduled with Dr. Mei on 4/10/24.

## 2024-04-04 ENCOUNTER — APPOINTMENT (OUTPATIENT)
Dept: RADIATION ONCOLOGY | Facility: CLINIC | Age: 64
End: 2024-04-04

## 2024-04-10 ENCOUNTER — APPOINTMENT (OUTPATIENT)
Dept: HEMATOLOGY ONCOLOGY | Facility: CLINIC | Age: 64
End: 2024-04-10

## 2024-05-01 ENCOUNTER — RESULT REVIEW (OUTPATIENT)
Age: 64
End: 2024-05-01

## 2024-05-06 ENCOUNTER — APPOINTMENT (OUTPATIENT)
Dept: BREAST CENTER | Facility: CLINIC | Age: 64
End: 2024-05-06
Payer: COMMERCIAL

## 2024-05-06 VITALS
WEIGHT: 133 LBS | SYSTOLIC BLOOD PRESSURE: 135 MMHG | DIASTOLIC BLOOD PRESSURE: 77 MMHG | HEIGHT: 64 IN | OXYGEN SATURATION: 97 % | BODY MASS INDEX: 22.71 KG/M2 | HEART RATE: 80 BPM

## 2024-05-06 DIAGNOSIS — Z85.3 PERSONAL HISTORY OF MALIGNANT NEOPLASM OF BREAST: ICD-10-CM

## 2024-05-06 DIAGNOSIS — Z90.11 ACQUIRED ABSENCE OF RIGHT BREAST AND NIPPLE: ICD-10-CM

## 2024-05-06 PROCEDURE — 99213 OFFICE O/P EST LOW 20 MIN: CPT

## 2024-05-20 ENCOUNTER — APPOINTMENT (OUTPATIENT)
Dept: HEMATOLOGY ONCOLOGY | Facility: CLINIC | Age: 64
End: 2024-05-20
Payer: COMMERCIAL

## 2024-05-20 ENCOUNTER — RESULT REVIEW (OUTPATIENT)
Age: 64
End: 2024-05-20

## 2024-05-20 VITALS
BODY MASS INDEX: 23.09 KG/M2 | OXYGEN SATURATION: 98 % | WEIGHT: 135.25 LBS | HEART RATE: 80 BPM | HEIGHT: 64 IN | RESPIRATION RATE: 16 BRPM | SYSTOLIC BLOOD PRESSURE: 124 MMHG | TEMPERATURE: 97.4 F | DIASTOLIC BLOOD PRESSURE: 87 MMHG

## 2024-05-20 DIAGNOSIS — D05.11 INTRADUCTAL CARCINOMA IN SITU OF RIGHT BREAST: ICD-10-CM

## 2024-05-20 PROCEDURE — 36415 COLL VENOUS BLD VENIPUNCTURE: CPT

## 2024-05-20 PROCEDURE — 99214 OFFICE O/P EST MOD 30 MIN: CPT | Mod: 25

## 2024-05-20 RX ORDER — PNV NO.95/FERROUS FUM/FOLIC AC 28MG-0.8MG
TABLET ORAL
Refills: 0 | Status: DISCONTINUED | COMMUNITY
End: 2024-05-20

## 2024-05-20 RX ORDER — UBIDECARENONE 30 MG
CAPSULE ORAL
Refills: 0 | Status: DISCONTINUED | COMMUNITY
End: 2024-05-20

## 2024-06-23 PROBLEM — D05.11 DUCTAL CARCINOMA IN SITU (DCIS) OF RIGHT BREAST: Status: ACTIVE | Noted: 2023-06-05

## 2024-06-23 NOTE — PHYSICAL EXAM
[de-identified] : bilateral breasts soft, no LAD noted, scar tissue noted to R surgical site  [de-identified] : ROM in tact to R hip

## 2024-06-23 NOTE — HISTORY OF PRESENT ILLNESS
[de-identified] : Ms. Rodriguez is a 63 year old postmenopausal female with no PMH that presents for initial consultation referred by Dr. Sanders for newly diagnosed R breast DCIS.   Oncological History:   s/p screening mammogram 23 revealing R breast calcifications   s/p diagnostic mammogram 23 revealing fibroglandular density tissue and in the right breast 12 o'clock position anterior third a 3 mm area of calcifications. mildly suspicious grouping of calcifications in the R breast. stereotactic bx recommended. BI-RADS 4.   s/p right stereotactic core biopsy 23 pathology revealing R breast 12:00 DCIS nuclear grade 2-3/3 solid and cribiform type with comedonecrosis and microcalcifications ER >95%, SC 1--15%  s/p R partial mastectomy 23 pathology revealing:   A. RIGHT PARTIAL MASTECTOMY, LONG LATERAL, SHORT SUPERIOR: - Ductal carcinoma in situ, intermediate nuclear grade, solid and cribriform patterns, with lobular extension - DCIS involves posterior aspect (final posterior margin is negative, see part B) - Microcalcifications associated with benign glands and DCIS (seen in prior core biopsy) - Prior biopsy site changes - See comment and synoptic report  B. DEEP SEGMENT, CLIP DAMON NEW MARGIN SIDE, RIGHT BREAST: - Benign fibroadipose tissue  C. ANTERIOR SEGMENT, CLIP DAMON NEW MARGIN SIDE, RIGHT BREAST: - Benign breast tissue  D. SUPERIOR SEGMENT, CLIP DAMON NEW MARGIN SIDE, RIGHT BREAST: - Ductal carcinoma in situ, intermediate nuclear grade, solid type, with lobular extension - Prior biopsy site changes - DCIS is 2.5 mm from inked margin - See comment  E. MEDIAL SEGMENT, CLIP DAMON NEW MARGIN SIDE, RIGHT BREAST: - Benign breast tissue  F. INFERIOR SEGMENT, CLIP DAMON NEW MARGIN SIDE, RIGHT BREAST: - Benign breast tissue  G. LATERAL SEGMENT, CLIP DAMON NEW MARGIN SIDE, RIGHT BREAST: - Benign breast tissue  H.  INFERIOR LATERAL MARGIN, CLIP DAMON NEW MARGIN SIDE, RIGHT BREAST: - Benign breast tissue  Breast Cancer Risk Factors: Menarche: 17 Date of LMP: at time of hysterectomy in  Menopause: yes   Age at first live birth: 31 Nursed: yes Hysterectomy: yes Oophorectomy: no  OCP: 3 mos HRT: no Last pap/pelvic exam: Dr. Zhou within last year no abmormal PAP Related family history half sister ovarian ca, maternal aunt multiple myeloma Ashkenazi: no BRCA testing: no   [de-identified] : Patient seen and examined today for routine follow up for the management of DCIS. She is now s/p completion of RT to the R breast 8/10/23. She started Anastrazole 9/3/23. Tolerating overall well. Denies hotflashes but does not that more recently R hip has been bothering her for which she attributes partially to working. She is s/p folow up with Dr. Sanders 5/2024. s/p mammo/sono 5/2024. Continues to follow with Dr. Davis. She has not yet gone to endo was seeing Dr. Gordillo. She remains off ca++ and vit d. taking vit C. Seeing Dr. Rueda this week.

## 2024-06-23 NOTE — ASSESSMENT
[FreeTextEntry1] : #DCIS - We have reviewed the diagnosis, prognosis and natural history of DCIS. - We have reviewed the imaging and pathology reports personally and discussed the findings with the patient. - We have discussed that she has already had a lumpectomy which is usually followed by radiation given the findings of DCIS. She has an appt with Radiation Oncology to discuss the role of Radiation. - We discussed the primary role of systemic treatment is to reduce the risk of invasive breast cancer and that endocrine therapy reduces recurrence rates, but has not been shown to improve survival. - The NSABP B-35 trial demonstrated that anastrozole resulted in a decreased rate of breast cancer events at 10 years compared with tamoxifen in postmenopausal women with hormone receptor-positive DCIS who underwent BCT. Anastrozole had a lower incidence of subsequent breast cancer events (recurrent DCIS or subsequent invasive breast cancer) including a lower rate of invasive breast cancer, Improved estimated breast cancer-free survival at 10 years, however no significant difference in either disease-free survival or overall survival. We will obtain a dexa scan to establish baseline bone density prior to starting Anastrozole. If normal bone density, would recommend Anastrozole, 1mg PO q day for a minimum of 5 years given that DCIS is ER/WI+ and she is post menopausal. We have reviewed the side effects of Anastrozole to include but are not limited to hot flashes, mood swings, vaginal dryness, decreased labido, arthralgias, bone pain, thinning of the bones including osteopenia/osteoporosis. She will take this with Ca++ 1200 mg PO q day and Vit D 800 IU daily to protect her bone health. - 9/20/23 vs and CBC reviewed; WBC 4.69, hgb 13.5, plt 223. s/p completion of RT to R breast 8/10/23. She started on Anastrozole 9/3/23 tolerating well. Continue. Encouraged vit D and ca++ however previous labs and CMP today with hypercalcemia, will hold off. s/p follow up with Dr. Davis 9/13/23 note reviewed. Follow up with breast surgery Dr. Sanders 9/28/23. Breast imaging per Dr. Sanders - 12/18/23 vs and CBC reviewed; WBC 5.83, hgb 12.6, plt 258. Remains on Anastrazole. s/p follow up with Dr. Sanders, note reviewed imaging due 5/2024. s/p follow up with Dr. Davis 12/14/23. Will be starting PT for R breast scar tissue. Remains off ca++ and vit D due to hypercalcemia. Has endo in [place] occasionally. Hypercalcemia work up sent today - 5/2024 vs and labs reviewed; still pending endo for hypercalecmia eval. s/p follow up with Dr. Sanders 5/2024 note reviewed. s/p mammo/sono BIRADS 2 5/2024 Continue Anastrazole   #Hip Pain  - New R hip pain - May be 2/2 AI vs overuse. Patient works at [employer]. Advised to call if worsening. Can consider imaging vs holding IAI to see if there is improvement   #Osteopenia - s/p DEXA 7/2023 revealing L spine -0.5, L femoral neck -1.2, L hip 0.3 - Reviewed will need to monitor DEXA q2y with AI use as it can contribute to bone loss - Recommend ca++, vit D and weight bearing exercises - as above labs from 6/2023 and 9/2023 reveal hypercalcemia without elevation to alk phos. PTH and PTHrp wnl. Has endo but has not seen. Reminded. Continues to hold ca++ and vit d   #Hypercalemia - 6/2023 labs Ca++ 11.0, corrected 10.6 - 9/2023 labs Ca++ 11.5, corrected 11, recommend holding vit d and not starting ca++ as above. Will complete hypercalcemia work up at next visit. Alk phos normal - 12/18/23 ca++ 10.9 today since being off calcium and d. corrected 10.5. Hypercalcemia work up sent today  - 5/2024  PTH and PTHrp wnl. Has endo but has not seen. Reminded. Continues to hold ca++ and vit d. Corrected today 10.5   RTC in 3 mos with CBC with diff, CMP, vit D, ionized ca++  Case and mangaement discussed with Dr. Mei.

## 2024-07-25 ENCOUNTER — NON-APPOINTMENT (OUTPATIENT)
Age: 64
End: 2024-07-25

## 2024-07-26 DIAGNOSIS — R53.1 WEAKNESS: ICD-10-CM

## 2024-07-26 DIAGNOSIS — G89.29 LOW BACK PAIN, UNSPECIFIED: ICD-10-CM

## 2024-07-26 DIAGNOSIS — M72.2 PLANTAR FASCIAL FIBROMATOSIS: ICD-10-CM

## 2024-07-26 DIAGNOSIS — M79.18 MYALGIA, OTHER SITE: ICD-10-CM

## 2024-07-26 DIAGNOSIS — M54.50 LOW BACK PAIN, UNSPECIFIED: ICD-10-CM

## 2024-07-29 ENCOUNTER — APPOINTMENT (OUTPATIENT)
Dept: SURGERY | Facility: CLINIC | Age: 64
End: 2024-07-29
Payer: COMMERCIAL

## 2024-07-29 VITALS — TEMPERATURE: 98.5 F | SYSTOLIC BLOOD PRESSURE: 148 MMHG | DIASTOLIC BLOOD PRESSURE: 92 MMHG | HEART RATE: 85 BPM

## 2024-07-29 DIAGNOSIS — E21.0 PRIMARY HYPERPARATHYROIDISM: ICD-10-CM

## 2024-07-29 PROCEDURE — 99204 OFFICE O/P NEW MOD 45 MIN: CPT

## 2024-07-29 NOTE — CONSULT LETTER
[Dear  ___] : Dear  [unfilled], [( Thank you for referring [unfilled] for consultation for _____ )] : Thank you for referring [unfilled] for consultation for [unfilled] [Please see my note below.] : Please see my note below. [Consult Closing:] : Thank you very much for allowing me to participate in the care of this patient.  If you have any questions, please do not hesitate to contact me. [FreeTextEntry3] : Kelly Seymour MD [DrJulian  ___] : Dr. TORRES

## 2024-07-29 NOTE — HISTORY OF PRESENT ILLNESS
[de-identified] : 65 yo F with h/o DCIS s/p surgery and XRT, presents for consultation regarding primary hyperparathyroidism. She has been followed for several years for mildly elevated calcium but recently has significant increase in the levels. Most recently, her Ca was 11.5-12 with , Vit D 24.7, 24h Urine 174. She has no history of kidney stones. Bone density shows mild bone loss. She has no family history of parathyroid disease. Her sister is on synthroid. She had a thyroid US in 2006 which showed a partially cystic upper pole nodule 1.1cm; a solid RLP nodule 1.3x0.6x0.9cm. She does not recall any follow up studies. She noted memory issues/word finding difficulty but no other symptoms of hyperparathyroidism.

## 2024-07-29 NOTE — PHYSICAL EXAM
[Respiratory Effort] : normal respiratory effort [Normal Rate and Rhythm] : normal rate and rhythm [No Rash or Lesion] : No rash or lesion [Calm] : calm [de-identified] : NAD, well-appearing  [de-identified] : anicteric [de-identified] : thyroid US showed a well-circumscribed RMP nodule 1x1x1.5cm, mostly solid, isoechoic; possible hypoechoic posterior lesion on the left suggesting a parathyroid adenoma

## 2024-07-29 NOTE — ASSESSMENT
[FreeTextEntry1] : 65 yo F with primary hyperparathyroidism. Calcium more than 1 above upper limit normal.  We had a long discussion about thyroid and parathyroid anatomy, physiology and pathophysiology. We discussed the diagnosis of primary hyperparathyroidism and its manifestations as single and double adenomas as well as 4-gland hyperplasia. We spoke about the utility of localization studies and their role in operative planning in terms of focused parathyroidectomy and 4-gland exploration. We discussed intraoperative PTH and nerve monitoring. We discussed the indications for surgery and the risks of surgery including bleeding, infection, recurrence of disease, injury to surrounding structures. We also discussed postop recovery including pain control, activity, diet and possible need for temporary calcium supplementation. The patient understands all this and would like to proceed. Will plan for parathyroidectomy once localization studies are completed.  Surgery will be under general anesthesia with intraoperative nerve and PTH monitoring at [facility] as an ambulatory procedure. Patient will obtain medical clearance prior to surgery. Planning for surgery in September.

## 2024-08-08 ENCOUNTER — APPOINTMENT (OUTPATIENT)
Dept: RADIATION ONCOLOGY | Facility: CLINIC | Age: 64
End: 2024-08-08

## 2024-08-08 PROCEDURE — G2211 COMPLEX E/M VISIT ADD ON: CPT

## 2024-08-08 PROCEDURE — 99214 OFFICE O/P EST MOD 30 MIN: CPT

## 2024-08-13 NOTE — DISEASE MANAGEMENT
[Pathological] : TNM Stage: p [0] : 0 [FreeTextEntry4] : Intermediate to High-grade ER/CA+ DCIS of the right breast [TTNM] : is [NTNM] : X [MTNM] : X

## 2024-08-13 NOTE — HISTORY OF PRESENT ILLNESS
[FreeTextEntry1] : Ms. Rodriguez is a 64-year-old female diagnosed with ER/ME+ DCIS of the right breast s/p right partial mastectomy and radiation therapy. She is present today for her routine follow-up status post 1 year completion.   Ms. Rodriguez underwent a mammogram 5/2/23 that revealed in the right breast 12 o'clock position anterior third a 3 mm area of calcifications. Stereotactic bx recommended. BI-RADS 4.  Ms. Rodriguez then underwent a right stereotactic core biopsy on 5/17/23 pathology revealed at the 12:00 position, DCIS nuclear grade 2-3/3 solid and cribiform type with comedonecrosis and microcalcifications ER >95%, ME 1--15%  On 6/23/23,  underwent a right partial mastectomy performed by Dr Sanders. Pathology revealed:  RIGHT PARTIAL MASTECTOMY, LONG LATERAL, SHORT SUPERIOR: - Ductal carcinoma in situ, intermediate nuclear grade, solid and cribriform patterns, with lobular extension - DCIS involved posterior aspect (final posterior margin was negative) - Microcalcifications associated with benign glands and DCIS (seen in prior core biopsy) - Prior biopsy site changes SUPERIOR SEGMENT, CLIP DAMON NEW MARGIN SIDE, RIGHT BREAST: - Ductal carcinoma in situ, intermediate nuclear grade, solid type, with lobular extension - Prior biopsy site changes - DCIS was 2.5 mm from inked margin  Ms. Rodriguez was treated with adjuvant radiation to the right breast to a dose of 4240 cGy completed on 8/10/23. She tolerated treatment well but suffered the loss of her mother while on treatment.  Ms. Rodriguez presents today for routine follow-up s/p one year completion. She offers no new breast related symptoms. She reports mild breast discomfort have since resolved. She has medical oncology follow-up in September and continues to take Anastrozole without any significant side effects. She has follow-up for Dr. Sanders in November 2024. Mammo and US (5/1/24) demonstrated no mammographic or sonographic evidence of malignancy. She is currently seeing Dr. Neri (endocrinologist) regarding hypercalcemia.  Overall, she is doing well.

## 2024-08-13 NOTE — PHYSICAL EXAM
[Normal] : oriented to person, place and time, the affect was normal, the mood was normal and not anxious [de-identified] : s/p right partial mastectomy

## 2024-08-13 NOTE — DISEASE MANAGEMENT
[Pathological] : TNM Stage: p [0] : 0 [FreeTextEntry4] : Intermediate to High-grade ER/DC+ DCIS of the right breast [TTNM] : is [NTNM] : X [MTNM] : X

## 2024-08-13 NOTE — PHYSICAL EXAM
[Normal] : oriented to person, place and time, the affect was normal, the mood was normal and not anxious [de-identified] : s/p right partial mastectomy

## 2024-08-13 NOTE — HISTORY OF PRESENT ILLNESS
[FreeTextEntry1] : Ms. Rodriguez is a 64-year-old female diagnosed with ER/MT+ DCIS of the right breast s/p right partial mastectomy and radiation therapy. She is present today for her routine follow-up status post 1 year completion.   Ms. Rodriguez underwent a mammogram 5/2/23 that revealed in the right breast 12 o'clock position anterior third a 3 mm area of calcifications. Stereotactic bx recommended. BI-RADS 4.  Ms. Rodriguez then underwent a right stereotactic core biopsy on 5/17/23 pathology revealed at the 12:00 position, DCIS nuclear grade 2-3/3 solid and cribiform type with comedonecrosis and microcalcifications ER >95%, MT 1--15%  On 6/23/23,  underwent a right partial mastectomy performed by Dr Sanders. Pathology revealed:  RIGHT PARTIAL MASTECTOMY, LONG LATERAL, SHORT SUPERIOR: - Ductal carcinoma in situ, intermediate nuclear grade, solid and cribriform patterns, with lobular extension - DCIS involved posterior aspect (final posterior margin was negative) - Microcalcifications associated with benign glands and DCIS (seen in prior core biopsy) - Prior biopsy site changes SUPERIOR SEGMENT, CLIP DAMON NEW MARGIN SIDE, RIGHT BREAST: - Ductal carcinoma in situ, intermediate nuclear grade, solid type, with lobular extension - Prior biopsy site changes - DCIS was 2.5 mm from inked margin  Ms. Rodriguez was treated with adjuvant radiation to the right breast to a dose of 4240 cGy completed on 8/10/23. She tolerated treatment well but suffered the loss of her mother while on treatment.  Ms. Rodriguez presents today for routine follow-up s/p one year completion. She offers no new breast related symptoms. She reports mild breast discomfort have since resolved. She has medical oncology follow-up in September and continues to take Anastrozole without any significant side effects. She has follow-up for Dr. Sanders in November 2024. Mammo and US (5/1/24) demonstrated no mammographic or sonographic evidence of malignancy. She is currently seeing Dr. Neri (endocrinologist) regarding hypercalcemia.  Overall, she is doing well.

## 2024-08-27 ENCOUNTER — RESULT REVIEW (OUTPATIENT)
Age: 64
End: 2024-08-27

## 2024-08-28 ENCOUNTER — NON-APPOINTMENT (OUTPATIENT)
Age: 64
End: 2024-08-28

## 2024-08-29 ENCOUNTER — NON-APPOINTMENT (OUTPATIENT)
Age: 64
End: 2024-08-29

## 2024-09-09 ENCOUNTER — RESULT REVIEW (OUTPATIENT)
Age: 64
End: 2024-09-09

## 2024-09-12 ENCOUNTER — NON-APPOINTMENT (OUTPATIENT)
Age: 64
End: 2024-09-12

## 2024-09-12 NOTE — ASSESSMENT
[FreeTextEntry1] : #DCIS - We have reviewed the diagnosis, prognosis and natural history of DCIS. - We have reviewed the imaging and pathology reports personally and discussed the findings with the patient. - We have discussed that she has already had a lumpectomy which is usually followed by radiation given the findings of DCIS. She has an appt with Radiation Oncology to discuss the role of Radiation. - We discussed the primary role of systemic treatment is to reduce the risk of invasive breast cancer and that endocrine therapy reduces recurrence rates, but has not been shown to improve survival. - The NSABP B-35 trial demonstrated that anastrozole resulted in a decreased rate of breast cancer events at 10 years compared with tamoxifen in postmenopausal women with hormone receptor-positive DCIS who underwent BCT. Anastrozole had a lower incidence of subsequent breast cancer events (recurrent DCIS or subsequent invasive breast cancer) including a lower rate of invasive breast cancer, Improved estimated breast cancer-free survival at 10 years, however no significant difference in either disease-free survival or overall survival. We will obtain a dexa scan to establish baseline bone density prior to starting Anastrozole. If normal bone density, would recommend Anastrozole, 1mg PO q day for a minimum of 5 years given that DCIS is ER/[place]+ and she is post menopausal. We have reviewed the side effects of Anastrozole to include but are not limited to hot flashes, mood swings, vaginal dryness, decreased labido, arthralgias, bone pain, thinning of the bones including osteopenia/osteoporosis. She will take this with Ca++ 1200 mg PO q day and Vit D 800 IU daily to protect her bone health. - 9/20/23 vs and CBC reviewed; WBC 4.69, hgb 13.5, plt 223. s/p completion of RT to R breast 8/10/23. She started on Anastrozole 9/3/23 tolerating well. Continue. Encouraged vit D and ca++ however previous labs and CMP today with hypercalcemia, will hold off. s/p follow up with Dr. Davis 9/13/23 note reviewed. Follow up with breast surgery Dr. Sanders 9/28/23. Breast imaging per Dr. Sanders - 12/18/23 vs and CBC reviewed; WBC 5.83, hgb 12.6, plt 258. Remains on Anastrazole. s/p follow up with Dr. Sanders, note reviewed imaging due 5/2024. s/p follow up with Dr. Davis 12/14/23. Will be starting PT for R breast scar tissue. Remains off ca++ and vit D due to hypercalcemia. Has endo in [place] occasionally. Hypercalcemia work up sent today - 5/2024 vs and labs reviewed; still pending endo for hypercalecmia eval. s/p follow up with Dr. Sanders 5/2024 note reviewed. s/p mammo/sono BIRADS 2 5/2024 Continue Anastrazole   #Hip Pain  - New R hip pain - May be 2/2 AI vs overuse. Patient works at [employer]. Advised to call if worsening. Can consider imaging vs holding IAI to see if there is improvement   #Osteopenia - s/p DEXA 7/2023 revealing L spine -0.5, L femoral neck -1.2, L hip 0.3 - Reviewed will need to monitor DEXA q2y with AI use as it can contribute to bone loss - Recommend ca++, vit D and weight bearing exercises - as above labs from 6/2023 and 9/2023 reveal hypercalcemia without elevation to alk phos. PTH and PTHrp wnl. Has endo but has not seen. Reminded. Continues to hold ca++ and vit d   #Hypercalemia - 6/2023 labs Ca++ 11.0, corrected 10.6 - 9/2023 labs Ca++ 11.5, corrected 11, recommend holding vit d and not starting ca++ as above. Will complete hypercalcemia work up at next visit. Alk phos normal - 12/18/23 ca++ 10.9 today since being off calcium and d. corrected 10.5. Hypercalcemia work up sent today  - 5/2024  PTH and PTHrp wnl. Has endo but has not seen. Reminded. Continues to hold ca++ and vit d. Corrected today 10.5   RTC in 3 mos with CBC with diff, CMP, vit D, ionized ca++  Case and mangaement discussed with Dr. Mei.

## 2024-09-12 NOTE — PHYSICAL EXAM
[Fully active, able to carry on all pre-disease performance without restriction] : Status 0 - Fully active, able to carry on all pre-disease performance without restriction [Normal] : affect appropriate [de-identified] : bilateral breasts soft, no LAD noted, scar tissue noted to R surgical site  [de-identified] : ROM in tact to R hip

## 2024-09-12 NOTE — REVIEW OF SYSTEMS
[Diarrhea: Grade 0] : Diarrhea: Grade 0 [Negative] : Allergic/Immunologic [FreeTextEntry9] : hip pain

## 2024-09-12 NOTE — HISTORY OF PRESENT ILLNESS
[de-identified] : Ms. Rodriguez is a 63 year old postmenopausal female with no PMH that presents for initial consultation referred by Dr. Sanders for newly diagnosed R breast DCIS.   Oncological History:   s/p screening mammogram 23 revealing R breast calcifications   s/p diagnostic mammogram 23 revealing fibroglandular density tissue and in the right breast 12 o'clock position anterior third a 3 mm area of calcifications. mildly suspicious grouping of calcifications in the R breast. stereotactic bx recommended. BI-RADS 4.   s/p right stereotactic core biopsy 23 pathology revealing R breast 12:00 DCIS nuclear grade 2-3/3 solid and cribiform type with comedonecrosis and microcalcifications ER >95%, NC 1--15%  s/p R partial mastectomy 23 pathology revealing:   A. RIGHT PARTIAL MASTECTOMY, LONG LATERAL, SHORT SUPERIOR: - Ductal carcinoma in situ, intermediate nuclear grade, solid and cribriform patterns, with lobular extension - DCIS involves posterior aspect (final posterior margin is negative, see part B) - Microcalcifications associated with benign glands and DCIS (seen in prior core biopsy) - Prior biopsy site changes - See comment and synoptic report  B. DEEP SEGMENT, CLIP DAMON NEW MARGIN SIDE, RIGHT BREAST: - Benign fibroadipose tissue  C. ANTERIOR SEGMENT, CLIP DAMON NEW MARGIN SIDE, RIGHT BREAST: - Benign breast tissue  D. SUPERIOR SEGMENT, CLIP DAMON NEW MARGIN SIDE, RIGHT BREAST: - Ductal carcinoma in situ, intermediate nuclear grade, solid type, with lobular extension - Prior biopsy site changes - DCIS is 2.5 mm from inked margin - See comment  E. MEDIAL SEGMENT, CLIP DAMON NEW MARGIN SIDE, RIGHT BREAST: - Benign breast tissue  F. INFERIOR SEGMENT, CLIP DAMON NEW MARGIN SIDE, RIGHT BREAST: - Benign breast tissue  G. LATERAL SEGMENT, CLIP DAMON NEW MARGIN SIDE, RIGHT BREAST: - Benign breast tissue  H.  INFERIOR LATERAL MARGIN, CLIP DAMON NEW MARGIN SIDE, RIGHT BREAST: - Benign breast tissue  Breast Cancer Risk Factors: Menarche: 17 Date of LMP: at time of hysterectomy in  Menopause: yes   Age at first live birth: 31 Nursed: yes Hysterectomy: yes Oophorectomy: no  OCP: 3 mos HRT: no Last pap/pelvic exam: Dr. Zhou within last year no abmormal PAP Related family history half sister ovarian ca, maternal aunt multiple myeloma Ashkenazi: no BRCA testing: no   [de-identified] : Patient seen and examined today for routine follow up for the management of DCIS. She is now s/p completion of RT to the R breast 8/10/23. She started Anastrazole 9/3/23. Tolerating overall well. Denies hotflashes but does not that more recently R hip has been bothering her for which she attributes partially to working. She is s/p folow up with Dr. Sanders 5/2024. s/p mammo/sono 5/2024. Continues to follow with Dr. Davis. She has not yet gone to endo was seeing Dr. Gordillo. She remains off ca++ and vit d. taking vit C. Seeing Dr. Rueda this week.

## 2024-09-17 ENCOUNTER — APPOINTMENT (OUTPATIENT)
Dept: SURGERY | Facility: CLINIC | Age: 64
End: 2024-09-17
Payer: COMMERCIAL

## 2024-09-17 VITALS — TEMPERATURE: 98.8 F | HEART RATE: 80 BPM | SYSTOLIC BLOOD PRESSURE: 153 MMHG | DIASTOLIC BLOOD PRESSURE: 94 MMHG

## 2024-09-17 DIAGNOSIS — E04.1 NONTOXIC SINGLE THYROID NODULE: ICD-10-CM

## 2024-09-17 PROCEDURE — 10005 FNA BX W/US GDN 1ST LES: CPT

## 2024-09-17 NOTE — PROCEDURE
[FreeTextEntry1] : After informed consent was signed by the patient and witness by the medical assistant, an ultrasound was used to identify the nodule of interest. The skin was prepped with alcohol. Using a 25G needle under US guidance, a fine needle aspiration was performed of the 1.8cm right lower pole thyroid nodule. The nodule was biopsied with three different passes. The specimen from the first two passes was placed on a labeled slide as well as rinsed in Cytolyt solution and the third pass was used for Thyroseq analysis reserved for indeterminate nodules. Hemostasis was confirmed. A small band-aid was placed on the neck. The patient tolerated the procedure well.

## 2024-09-17 NOTE — ASSESSMENT
[FreeTextEntry1] : Successful FNA of right thyroid nodule.  Patient expressed desire of right thyroidectomy with parathyroidectomy  Risks and benefits of adding hemithyroidectomy explained including nerve injury, possible need for thyroid replacement, bleeding. Patient would like to proceed with both procedures. Will obtain presurgical testing Oct 10 was her preferred date.

## 2024-09-18 ENCOUNTER — NON-APPOINTMENT (OUTPATIENT)
Age: 64
End: 2024-09-18

## 2024-09-18 ENCOUNTER — RESULT REVIEW (OUTPATIENT)
Age: 64
End: 2024-09-18

## 2024-09-18 ENCOUNTER — APPOINTMENT (OUTPATIENT)
Dept: HEMATOLOGY ONCOLOGY | Facility: CLINIC | Age: 64
End: 2024-09-18

## 2024-10-10 ENCOUNTER — RESULT REVIEW (OUTPATIENT)
Age: 64
End: 2024-10-10

## 2024-10-10 ENCOUNTER — APPOINTMENT (OUTPATIENT)
Dept: SURGERY | Facility: HOSPITAL | Age: 64
End: 2024-10-10

## 2024-10-10 ENCOUNTER — TRANSCRIPTION ENCOUNTER (OUTPATIENT)
Age: 64
End: 2024-10-10

## 2024-10-14 ENCOUNTER — NON-APPOINTMENT (OUTPATIENT)
Age: 64
End: 2024-10-14

## 2024-10-14 ENCOUNTER — APPOINTMENT (OUTPATIENT)
Dept: HEMATOLOGY ONCOLOGY | Facility: CLINIC | Age: 64
End: 2024-10-14

## 2024-10-14 VITALS
HEART RATE: 88 BPM | OXYGEN SATURATION: 99 % | BODY MASS INDEX: 23.56 KG/M2 | WEIGHT: 138 LBS | RESPIRATION RATE: 18 BRPM | TEMPERATURE: 97 F | HEIGHT: 64 IN | DIASTOLIC BLOOD PRESSURE: 85 MMHG | SYSTOLIC BLOOD PRESSURE: 149 MMHG

## 2024-10-14 DIAGNOSIS — E83.52 HYPERCALCEMIA: ICD-10-CM

## 2024-10-14 DIAGNOSIS — D05.11 INTRADUCTAL CARCINOMA IN SITU OF RIGHT BREAST: ICD-10-CM

## 2024-10-14 DIAGNOSIS — Z17.0 ESTROGEN RECEPTOR POSITIVE STATUS [ER+]: ICD-10-CM

## 2024-10-14 PROCEDURE — 36415 COLL VENOUS BLD VENIPUNCTURE: CPT

## 2024-10-14 PROCEDURE — 99214 OFFICE O/P EST MOD 30 MIN: CPT

## 2024-10-21 ENCOUNTER — APPOINTMENT (OUTPATIENT)
Dept: SURGERY | Facility: CLINIC | Age: 64
End: 2024-10-21
Payer: COMMERCIAL

## 2024-10-21 VITALS — HEART RATE: 91 BPM | SYSTOLIC BLOOD PRESSURE: 138 MMHG | TEMPERATURE: 97.6 F | DIASTOLIC BLOOD PRESSURE: 81 MMHG

## 2024-10-21 DIAGNOSIS — E21.0 PRIMARY HYPERPARATHYROIDISM: ICD-10-CM

## 2024-10-21 PROCEDURE — 99024 POSTOP FOLLOW-UP VISIT: CPT

## 2024-10-23 ENCOUNTER — NON-APPOINTMENT (OUTPATIENT)
Age: 64
End: 2024-10-23

## 2024-11-05 ENCOUNTER — APPOINTMENT (OUTPATIENT)
Dept: BREAST CENTER | Facility: CLINIC | Age: 64
End: 2024-11-05
Payer: COMMERCIAL

## 2024-11-05 VITALS
OXYGEN SATURATION: 98 % | SYSTOLIC BLOOD PRESSURE: 153 MMHG | WEIGHT: 138 LBS | BODY MASS INDEX: 23.56 KG/M2 | DIASTOLIC BLOOD PRESSURE: 76 MMHG | HEIGHT: 64 IN | HEART RATE: 80 BPM

## 2024-11-05 DIAGNOSIS — D05.11 INTRADUCTAL CARCINOMA IN SITU OF RIGHT BREAST: ICD-10-CM

## 2024-11-05 DIAGNOSIS — Z90.11 ACQUIRED ABSENCE OF RIGHT BREAST AND NIPPLE: ICD-10-CM

## 2024-11-05 PROCEDURE — 99213 OFFICE O/P EST LOW 20 MIN: CPT

## 2024-11-20 ENCOUNTER — APPOINTMENT (OUTPATIENT)
Dept: ORTHOPEDIC SURGERY | Facility: CLINIC | Age: 64
End: 2024-11-20
Payer: OTHER MISCELLANEOUS

## 2024-11-20 VITALS — WEIGHT: 138 LBS | BODY MASS INDEX: 23.56 KG/M2 | HEIGHT: 64 IN

## 2024-11-20 DIAGNOSIS — M54.16 RADICULOPATHY, LUMBAR REGION: ICD-10-CM

## 2024-11-20 PROCEDURE — 72110 X-RAY EXAM L-2 SPINE 4/>VWS: CPT

## 2024-11-20 PROCEDURE — 99204 OFFICE O/P NEW MOD 45 MIN: CPT

## 2024-12-18 ENCOUNTER — APPOINTMENT (OUTPATIENT)
Dept: RADIATION ONCOLOGY | Facility: CLINIC | Age: 64
End: 2024-12-18
Payer: COMMERCIAL

## 2024-12-18 ENCOUNTER — RESULT REVIEW (OUTPATIENT)
Age: 64
End: 2024-12-18

## 2024-12-18 VITALS
RESPIRATION RATE: 18 BRPM | HEIGHT: 64 IN | WEIGHT: 138 LBS | BODY MASS INDEX: 23.56 KG/M2 | SYSTOLIC BLOOD PRESSURE: 152 MMHG | HEART RATE: 84 BPM | OXYGEN SATURATION: 98 % | DIASTOLIC BLOOD PRESSURE: 95 MMHG

## 2024-12-18 DIAGNOSIS — D05.11 INTRADUCTAL CARCINOMA IN SITU OF RIGHT BREAST: ICD-10-CM

## 2024-12-18 PROCEDURE — 99214 OFFICE O/P EST MOD 30 MIN: CPT

## 2024-12-18 PROCEDURE — G2211 COMPLEX E/M VISIT ADD ON: CPT

## 2025-02-24 ENCOUNTER — NON-APPOINTMENT (OUTPATIENT)
Age: 65
End: 2025-02-24

## 2025-02-24 ENCOUNTER — APPOINTMENT (OUTPATIENT)
Dept: HEART FAILURE | Facility: CLINIC | Age: 65
End: 2025-02-24
Payer: COMMERCIAL

## 2025-02-24 VITALS
SYSTOLIC BLOOD PRESSURE: 145 MMHG | DIASTOLIC BLOOD PRESSURE: 91 MMHG | WEIGHT: 138 LBS | BODY MASS INDEX: 23.56 KG/M2 | HEART RATE: 88 BPM | OXYGEN SATURATION: 98 % | TEMPERATURE: 98.1 F | HEIGHT: 64 IN

## 2025-02-24 VITALS — SYSTOLIC BLOOD PRESSURE: 132 MMHG | DIASTOLIC BLOOD PRESSURE: 80 MMHG

## 2025-02-24 PROCEDURE — 93000 ELECTROCARDIOGRAM COMPLETE: CPT

## 2025-02-24 PROCEDURE — 99205 OFFICE O/P NEW HI 60 MIN: CPT

## 2025-02-24 RX ORDER — CHLORTHALIDONE 25 MG/1
25 TABLET ORAL
Refills: 0 | Status: ACTIVE | COMMUNITY

## 2025-03-04 ENCOUNTER — APPOINTMENT (OUTPATIENT)
Dept: BREAST CENTER | Facility: CLINIC | Age: 65
End: 2025-03-04
Payer: COMMERCIAL

## 2025-03-04 VITALS
SYSTOLIC BLOOD PRESSURE: 127 MMHG | BODY MASS INDEX: 23.56 KG/M2 | DIASTOLIC BLOOD PRESSURE: 74 MMHG | HEART RATE: 77 BPM | OXYGEN SATURATION: 96 % | WEIGHT: 138 LBS | HEIGHT: 64 IN

## 2025-03-04 DIAGNOSIS — N64.4 MASTODYNIA: ICD-10-CM

## 2025-03-04 DIAGNOSIS — Z90.11 ACQUIRED ABSENCE OF RIGHT BREAST AND NIPPLE: ICD-10-CM

## 2025-03-04 DIAGNOSIS — D05.11 INTRADUCTAL CARCINOMA IN SITU OF RIGHT BREAST: ICD-10-CM

## 2025-03-04 DIAGNOSIS — Z85.3 PERSONAL HISTORY OF MALIGNANT NEOPLASM OF BREAST: ICD-10-CM

## 2025-03-04 PROCEDURE — 99213 OFFICE O/P EST LOW 20 MIN: CPT

## 2025-04-15 ENCOUNTER — NON-APPOINTMENT (OUTPATIENT)
Age: 65
End: 2025-04-15

## 2025-04-15 ENCOUNTER — APPOINTMENT (OUTPATIENT)
Dept: HEMATOLOGY ONCOLOGY | Facility: CLINIC | Age: 65
End: 2025-04-15

## 2025-04-21 ENCOUNTER — APPOINTMENT (OUTPATIENT)
Dept: HEMATOLOGY ONCOLOGY | Facility: CLINIC | Age: 65
End: 2025-04-21

## 2025-04-21 ENCOUNTER — NON-APPOINTMENT (OUTPATIENT)
Age: 65
End: 2025-04-21

## 2025-05-01 ENCOUNTER — APPOINTMENT (OUTPATIENT)
Dept: HEMATOLOGY ONCOLOGY | Facility: CLINIC | Age: 65
End: 2025-05-01
Payer: COMMERCIAL

## 2025-05-01 ENCOUNTER — RESULT REVIEW (OUTPATIENT)
Age: 65
End: 2025-05-01

## 2025-05-01 VITALS
HEART RATE: 84 BPM | OXYGEN SATURATION: 99 % | RESPIRATION RATE: 18 BRPM | SYSTOLIC BLOOD PRESSURE: 117 MMHG | HEIGHT: 64 IN | DIASTOLIC BLOOD PRESSURE: 76 MMHG | TEMPERATURE: 96.9 F | BODY MASS INDEX: 23.49 KG/M2 | WEIGHT: 137.56 LBS

## 2025-05-01 DIAGNOSIS — E21.0 PRIMARY HYPERPARATHYROIDISM: ICD-10-CM

## 2025-05-01 DIAGNOSIS — D05.11 INTRADUCTAL CARCINOMA IN SITU OF RIGHT BREAST: ICD-10-CM

## 2025-05-01 PROCEDURE — 36415 COLL VENOUS BLD VENIPUNCTURE: CPT

## 2025-05-01 PROCEDURE — 99214 OFFICE O/P EST MOD 30 MIN: CPT | Mod: 25

## 2025-05-12 ENCOUNTER — NON-APPOINTMENT (OUTPATIENT)
Age: 65
End: 2025-05-12

## 2025-05-13 ENCOUNTER — RESULT REVIEW (OUTPATIENT)
Age: 65
End: 2025-05-13

## 2025-06-03 ENCOUNTER — APPOINTMENT (OUTPATIENT)
Dept: BREAST CENTER | Facility: CLINIC | Age: 65
End: 2025-06-03
Payer: COMMERCIAL

## 2025-06-03 ENCOUNTER — NON-APPOINTMENT (OUTPATIENT)
Age: 65
End: 2025-06-03

## 2025-06-03 VITALS
WEIGHT: 137 LBS | SYSTOLIC BLOOD PRESSURE: 127 MMHG | HEART RATE: 78 BPM | BODY MASS INDEX: 23.39 KG/M2 | OXYGEN SATURATION: 99 % | DIASTOLIC BLOOD PRESSURE: 70 MMHG | HEIGHT: 64 IN

## 2025-06-03 DIAGNOSIS — N64.4 MASTODYNIA: ICD-10-CM

## 2025-06-03 DIAGNOSIS — D05.11 INTRADUCTAL CARCINOMA IN SITU OF RIGHT BREAST: ICD-10-CM

## 2025-06-03 DIAGNOSIS — Z90.11 ACQUIRED ABSENCE OF RIGHT BREAST AND NIPPLE: ICD-10-CM

## 2025-06-03 PROCEDURE — 99212 OFFICE O/P EST SF 10 MIN: CPT

## 2025-06-26 ENCOUNTER — APPOINTMENT (OUTPATIENT)
Dept: RADIATION ONCOLOGY | Facility: CLINIC | Age: 65
End: 2025-06-26
Payer: COMMERCIAL

## 2025-06-26 VITALS
OXYGEN SATURATION: 99 % | BODY MASS INDEX: 22.88 KG/M2 | RESPIRATION RATE: 18 BRPM | SYSTOLIC BLOOD PRESSURE: 124 MMHG | TEMPERATURE: 97.2 F | HEART RATE: 86 BPM | HEIGHT: 64 IN | WEIGHT: 134 LBS | DIASTOLIC BLOOD PRESSURE: 82 MMHG

## 2025-06-26 PROCEDURE — 99214 OFFICE O/P EST MOD 30 MIN: CPT

## 2025-06-26 PROCEDURE — G2211 COMPLEX E/M VISIT ADD ON: CPT
